# Patient Record
Sex: FEMALE | Race: WHITE | NOT HISPANIC OR LATINO | URBAN - METROPOLITAN AREA
[De-identification: names, ages, dates, MRNs, and addresses within clinical notes are randomized per-mention and may not be internally consistent; named-entity substitution may affect disease eponyms.]

---

## 2017-01-16 ENCOUNTER — OFFICE VISIT (OUTPATIENT)
Dept: OTOLARYNGOLOGY | Facility: CLINIC | Age: 57
End: 2017-01-16

## 2017-01-16 DIAGNOSIS — J38.00 VOCAL CORD PARESIS: Primary | ICD-10-CM

## 2017-01-16 RX ORDER — ESCITALOPRAM OXALATE 10 MG/1
10 TABLET ORAL DAILY
COMMUNITY
End: 2017-10-20

## 2017-01-16 ASSESSMENT — PAIN SCALES - GENERAL: PAINLEVEL: NO PAIN (0)

## 2017-01-16 NOTE — Clinical Note
1/16/2017       RE: Vianey Manning  [address] [facility] [address]     Dear Colleague,    Thank you for referring your patient, Vianey Manning, to the [facility] EAR NOSE AND THROAT at [facility]. Please see a copy of my visit note below.    HISTORY OF PRESENT ILLNESS:  Ms. Manning is back to see us again today.  She feels that her voice is maintaining its strength.  She had a recurrent nerve injury, presumably associated with a thoracic procedure which is felt to likely be temporary.  This happened about a year ago.  We did then do an injection with hydroxyapatite onto the left cord.  She has been doing fairly well since then.      PHYSICAL EXAMINATION:  She is in no distress, alert, interactive, breathing without difficulty or stridor.  Eyes are anicteric.  Skin of the head and neck appears normal.         PROCEDURE:  At that point, the nose is sprayed with lidocaine and Afrin.  A flexible laryngoscopy is performed.  The nasopharynx is normal.  Oropharynx is normal.  Hypopharynx is normal.  The larynx shows left cord still well medialized but not moving.  Right cord moves well, but decent glottic closure on phonation.      ASSESSMENT AND PLAN:  A 56-year-old with persistent left vocal fold paresis.  Implant is still seemingly working well for her.  Follow up in six months or sooner with any problems.      SJ/lz       Again, thank you for allowing me to participate in the care of your patient.      Sincerely,    Mikey Encarnacion MD

## 2017-01-16 NOTE — PROGRESS NOTES
HISTORY OF PRESENT ILLNESS:  Ms. Manning is back to see us again today.  She feels that her voice is maintaining its strength.  She had a recurrent nerve injury, presumably associated with a thoracic procedure which is felt to likely be temporary.  This happened about a year ago.  We did then do an injection with hydroxyapatite onto the left cord.  She has been doing fairly well since then.      PHYSICAL EXAMINATION:  She is in no distress, alert, interactive, breathing without difficulty or stridor.  Eyes are anicteric.  Skin of the head and neck appears normal.         PROCEDURE:  At that point, the nose is sprayed with lidocaine and Afrin.  A flexible laryngoscopy is performed.  The nasopharynx is normal.  Oropharynx is normal.  Hypopharynx is normal.  The larynx shows left cord still well medialized but not moving.  Right cord moves well, but decent glottic closure on phonation.      ASSESSMENT AND PLAN:  A 56-year-old with persistent left vocal fold paresis.  Implant is still seemingly working well for her.  Follow up in six months or sooner with any problems.      SJ/shane

## 2017-01-16 NOTE — PATIENT INSTRUCTIONS
Please follow up in about 6 months to see Dr Encarnacion.  For any questions or concerns in the meantime please call our nurse line at [phone].

## 2017-04-21 ENCOUNTER — OFFICE VISIT (OUTPATIENT)
Dept: SURGERY | Facility: CLINIC | Age: 57
End: 2017-04-21
Attending: CLINICAL NURSE SPECIALIST
Payer: COMMERCIAL

## 2017-04-21 VITALS
WEIGHT: 177.47 LBS | TEMPERATURE: 98.5 F | DIASTOLIC BLOOD PRESSURE: 72 MMHG | HEART RATE: 82 BPM | SYSTOLIC BLOOD PRESSURE: 104 MMHG | BODY MASS INDEX: 30.45 KG/M2 | RESPIRATION RATE: 18 BRPM | OXYGEN SATURATION: 97 %

## 2017-04-21 DIAGNOSIS — Z85.118 H/O: LUNG CANCER: ICD-10-CM

## 2017-04-21 DIAGNOSIS — R91.8 LUNG NODULES: Primary | ICD-10-CM

## 2017-04-21 PROCEDURE — 99212 OFFICE O/P EST SF 10 MIN: CPT

## 2017-04-21 ASSESSMENT — ENCOUNTER SYMPTOMS
NERVOUS/ANXIOUS: 1
HOARSE VOICE: 1

## 2017-04-21 ASSESSMENT — PAIN SCALES - GENERAL: PAINLEVEL: NO PAIN (0)

## 2017-04-21 NOTE — LETTER
4/21/2017       RE: Vianey Manning  [address] [address]     Dear Colleague,    Thank you for referring your patient, Vianey Manning, to the [facility] CANCER [facility]IC. Please see a copy of my visit note below.    REASON FOR VISIT:  6 month surveillance chest CT and follow-up    PRE-OP Diagnosis:   Left lower lobe adenocarcinoma    PROCEDURES PERFORMED:   12/4/15  Left transcervical/thoracoscopic wedge resection  1/25/16  Thoracoscopic LEFT lower lobe completion lobectomy, mediastinal lymphadenectomy    SURGEON:   Dr. Gabriel Herrera    Assessment:   I last saw Ms. Manning in Oct. 2016.   She has been seen x 2 by ENT here for a persistent left vocal cord paresis, had vocal cord injection with Prolaryn Plus for vocal cord mediaslization.  She feels that her voice strength is maintaining at a good level, she continues to teach.   She is anxious today, says she always gets a little nervous when this appointment is due.   She is teaching full-time.    She denies any recent illnesses.    She had a chest CT prior to this appointment, findings include:  1. Stable postoperative changes of left lower lobectomy. No definitive  CT findings within the chest or visualized abdomen to suggest  recurrent or metastatic disease.  2. Indeterminant 3 mm soft tissue pulmonary nodules in the lingula and  right upper lobe, not significantly changed from prior study  10/20/2016. Attention on follow-up recommended    Plan:   Advised to get another chest CT in 6 months.  If the nodules remain unchanged, may then go to annual chest CT scans.       Total time:  30 minutes      Again, thank you for allowing me to participate in the care of your patient.      Sincerely,    SCOT Napier CNS

## 2017-04-21 NOTE — NURSING NOTE
"Vianey Manning is a 57 year old female who presents for:  Chief Complaint   Patient presents with     Oncology Clinic Visit     Return for CT results        Initial Vitals:  /72 (BP Location: Left arm, Patient Position: Chair, Cuff Size: Adult Large)  Pulse 82  Temp 98.5  F (36.9  C) (Oral)  Resp 18  Wt 80.5 kg (177 lb 7.5 oz)  SpO2 97%  BMI 30.45 kg/m2 Estimated body mass index is 30.45 kg/(m^2) as calculated from the following:    Height as of 10/20/16: 1.626 m (5' 4.02\").    Weight as of this encounter: 80.5 kg (177 lb 7.5 oz).. Body surface area is 1.91 meters squared. BP completed using cuff size: large  No Pain (0) No LMP recorded. Patient is postmenopausal. Allergies and medications reviewed.     Medications: Medication refills not needed today.  Pharmacy name entered into [employer]: [facility] [address] [place] [address] [address] AT [facility] [address]    Comments:     6  minutes for nursing intake (face to face time)   Lawanda Castillo MA        "

## 2017-04-21 NOTE — PROGRESS NOTES
REASON FOR VISIT:  6 month surveillance chest CT and follow-up    PRE-OP Diagnosis:   Left lower lobe adenocarcinoma    PROCEDURES PERFORMED:   12/4/15  Left transcervical/thoracoscopic wedge resection  1/25/16  Thoracoscopic LEFT lower lobe completion lobectomy, mediastinal lymphadenectomy    SURGEON:   Dr. Gabriel Herrera    Assessment:   I last saw Ms. Manning in Oct. 2016.   She has been seen x 2 by ENT here for a persistent left vocal cord paresis, had vocal cord injection with Prolaryn Plus for vocal cord mediaslization.  She feels that her voice strength is maintaining at a good level, she continues to teach.   She is anxious today, says she always gets a little nervous when this appointment is due.   She is teaching full-time.    She denies any recent illnesses.    She had a chest CT prior to this appointment, findings include:  1. Stable postoperative changes of left lower lobectomy. No definitive  CT findings within the chest or visualized abdomen to suggest  recurrent or metastatic disease.  2. Indeterminant 3 mm soft tissue pulmonary nodules in the lingula and  right upper lobe, not significantly changed from prior study  10/20/2016. Attention on follow-up recommended    Plan:   Advised to get another chest CT in 6 months.  If the nodules remain unchanged, may then go to annual chest CT scans.      Total time:  30 minutes

## 2017-04-21 NOTE — MR AVS SNAPSHOT
After Visit Summary   4/21/2017    Vianey Manning    MRN: [id number]           Patient Information     Date Of Birth          1960        Visit Information        Provider Department      4/21/2017 2:30 PM Janeth Key APRN CNS [facility]        Today's Diagnoses     Lung nodules    -  1    H/O: lung cancer           Follow-ups after your visit        Follow-up notes from your care team     Return in about 6 months (around 10/21/2017).      Your next 10 appointments already scheduled     Jul 17, 2017  9:00 AM CDT   (Arrive by 8:45 AM)   Return Visit with Mikey Encarnacion MD   [facility] Ear Nose and Throat ([facility] [place])    [address] [facility]  [address] [facility] [address] [phone]              Future tests that were ordered for you today     Open Future Orders        Priority Expected Expires Ordered    CT Chest w/o contrast Routine  4/21/2018 4/21/2017            Who to contact     If you have questions or need follow up information about today's clinic visit or your schedule please contact [facility] CANCER [facility] directly at [phone].  Normal or non-critical lab and imaging results will be communicated to you by MyChart, letter or phone within 4 business days after the clinic has received the results. If you do not hear from us within 7 days, please contact the clinic through [employer] or phone. If you have a critical or abnormal lab result, we will notify you by phone as soon as possible.  Submit refill requests through [employer] or call your pharmacy and they will forward the refill request to us. Please allow 3 business days for your refill to be completed.          Additional Information About Your Visit        MyChart Information     [employer] gives you secure access to your electronic health record. If you see a primary care provider, you can also send messages to your care team and make appointments. If you have  questions, please call your primary care clinic.  If you do not have a primary care provider, please call [phone] and they will assist you.        Care EveryWhere ID     This is your Care EveryWhere ID. This could be used by other organizations to access your [place] medical records  [id number]        Your Vitals Were     Pulse Temperature Respirations Pulse Oximetry BMI (Body Mass Index)       82 98.5  F (36.9  C) (Oral) 18 97% 30.45 kg/m2        Blood Pressure from Last 3 Encounters:   04/21/17 104/72   10/20/16 126/73   05/09/16 120/78    Weight from Last 3 Encounters:   04/21/17 80.5 kg (177 lb 7.5 oz)   10/20/16 77.7 kg (171 lb 6.4 oz)   05/09/16 75.8 kg (167 lb)               Primary Care Provider Office Phone # Fax #    Addison Cobos [phone]       [address] [address]        Thank you!     Thank you for choosing [facility]  for your care. Our goal is always to provide you with excellent care. Hearing back from our patients is one way we can continue to improve our services. Please take a few minutes to complete the written survey that you may receive in the mail after your visit with us. Thank you!             Your Updated Medication List - Protect others around you: Learn how to safely use, store and throw away your medicines at www.disposemymeds.org.          This list is accurate as of: 4/21/17  3:05 PM.  Always use your most recent med list.                   Brand Name Dispense Instructions for use    ASPIRIN PO      Take 81 mg by mouth daily       LEXAPRO 10 MG tablet   Generic drug:  escitalopram      Take 10 mg by mouth daily       SIMVASTATIN PO      Take 10 mg by mouth At Bedtime

## 2017-07-17 ENCOUNTER — OFFICE VISIT (OUTPATIENT)
Dept: OTOLARYNGOLOGY | Facility: CLINIC | Age: 57
End: 2017-07-17

## 2017-07-17 VITALS — BODY MASS INDEX: 31.58 KG/M2 | WEIGHT: 185 LBS | HEIGHT: 64 IN

## 2017-07-17 DIAGNOSIS — J38.00 VOCAL CORD PARALYSIS: Primary | ICD-10-CM

## 2017-07-17 ASSESSMENT — PAIN SCALES - GENERAL: PAINLEVEL: NO PAIN (0)

## 2017-07-17 NOTE — NURSING NOTE
Chief Complaint   Patient presents with     RECHECK     Return - 6 months F/U      Pt states no pain today.    N Tony LAROSE

## 2017-07-17 NOTE — MR AVS SNAPSHOT
After Visit Summary   7/17/2017    Vianey Manning    MRN: [id number]           Patient Information     Date Of Birth          1960        Visit Information        Provider Department      7/17/2017 9:00 AM Mikey Encarnacion MD [facility] Ear Nose and Throat        Care Instructions    1.  You were seen in the ENT Clinic today by Dr. Encarnacion.  If you have any questions or concerns after your appointment, please call [phone].  Press option #1 for scheduling related needs.  Press option #3 for Nurse advice.  2.  Plan is to return to clinic in 6 months for another assessment.                Follow-ups after your visit        Your next 10 appointments already scheduled     Oct 20, 2017  1:40 PM CDT   (Arrive by 1:25 PM)   CT CHEST W/O CONTRAST with UCCT1   [facility] [place] [facility] ([facility])    [address] [address] [phone]           Please bring any scans or X-rays taken at other hospitals, if similar tests were done. Also bring a list of your medicines, including vitamins, minerals and over-the-counter drugs. It is safest to leave personal items at home.  Be sure to tell your doctor:   If you have any allergies.   If there s any chance you are pregnant.   If you are breastfeeding.   If you have any special needs.  You do not need to do anything special to prepare.  Please wear loose clothing, such as a sweat suit or jogging clothes. Avoid snaps, zippers and other metal. We may ask you to undress and put on a hospital gown.            Oct 20, 2017  2:30 PM CDT   (Arrive by 2:15 PM)   Return Visit with SCOT Napier [facility] ([facility])    [address] [facility] [address] [facility]  [address] [facility] [address] [phone]            Nov 20, 2017  9:00 AM CST   (Arrive by 8:45 AM)   Return Visit with MD TOMA Parsons [facility] Ear Nose [facility] Th[facility]at ([facility]    [address] [facility] [address] [facility]  [address] [facility] [address] [phone]              Who to contact     Please call your clinic at [phone] to:    Ask questions about your health    Make or cancel appointments    Discuss your medicines    Learn about your test results    Speak to your doctor   If you have compliments or concerns about an experience at your clinic, or if you wish to file a complaint, please contact [facility] Patient Relations at [phone] or email us at [email or web address]         Additional Information About Your Visit        [employer] Information     [employer] gives you secure access to your electronic health record. If you see a primary care provider, you can also send messages to your care team and make appointments. If you have questions, please call your primary care clinic.  If you do not have a primary care provider, please call [phone] and they will assist you.      SMATOOS is an electronic gateway that provides easy, online access to your medical records. With SMATOOS, you can request a clinic appointment, read your test results, renew a prescription or communicate with your care team.     To access your existing account, please contact your [facility] Clinic or call [phone] for assistance.        Care EveryWhere ID     This is your Care EveryWhere ID. This could be used by other organizations to access your [place] medical records  [id number]        Your Vitals Were     Height BMI (Body Mass Index)                1.626 m (5' 4\") 31.76 kg/m2           Blood Pressure from Last 3 Encounters:   04/21/17 104/72   10/20/16 126/73   05/09/16 120/78    Weight from Last 3 Encounters:   07/17/17 83.9 kg (185 lb)   04/21/17 80.5 kg (177 lb 7.5 oz)   10/20/16 77.7 kg (171 lb 6.4 oz)              Today, you had the following     No orders found for display       Primary Care Provider Office Phone # Fax " #    Addison Cobos [phone]       [facility] [address] [facility] [address]        Equal Access to Services     JESSICA FINNEGAN : Hadii aad ku hadmahadamada Sojaxson, wadenysda luqadaha, qaybta kaalmada rena, macho clintin hayaavinicio linmady garcia darrius rosenbaum. So [facility] [phone].    ATENCIÓN: Si habla español, tiene a mir disposición servicios gratuitos de asistencia lingüística. Llame al [phone].    We comply with applicable federal civil rights laws and [place] laws. We do not discriminate on the basis of race, color, national origin, age, disability sex, sexual orientation or gender identity.            Thank you!     Thank you for choosing [facility] EAR NOSE AND THROAT  for your care. Our goal is always to provide you with excellent care. Hearing back from our patients is one way we can continue to improve our services. Please take a few minutes to complete the written survey that you may receive in the mail after your visit with us. Thank you!             Your Updated Medication List - Protect others around you: Learn how to safely use, store and throw away your medicines at www.disposemymeds.org.          This list is accurate as of: 7/17/17  9:24 AM.  Always use your most recent med list.                   Brand Name Dispense Instructions for use Diagnosis    ASPIRIN PO      Take 81 mg by mouth daily        LEXAPRO 10 MG tablet   Generic drug:  escitalopram      Take 10 mg by mouth daily        SIMVASTATIN PO      Take 10 mg by mouth At Bedtime

## 2017-07-17 NOTE — PROGRESS NOTES
Ms. Manning is back to see us in today. Her voice has been quite stable. It is been a year and a half since her thoracic procedure which seems to have initiated her left vocal cord paresis.    PHYSICAL EXAMINATION:  This is a 57 year old  year old female   in no acute distress.  Normal mood, normal affect, speech is strong.  Alert and appropriate.  Head is normocephalic.  Cranial nerve VII is House-Brackmann I out of VI bilaterally.  Breathing without difficulty or stridor.  Eyes are anicteric.       At that point verbal consent is obtained, the nose is sprayed with lidocaine and Afrin and flexible endoscopy is performed into the nose.  the nasopharynx, the oropharynx, the larynx shows left focal cord paralysis, good closure and phonation with good movement of the right cord.    57-year-old with persistent left vocal cord paresis. At this point plan on continued monitoring as she is getting good use of her injection implant. Follow-up in 5 months.    Mikey Encarnacion

## 2017-07-17 NOTE — PATIENT INSTRUCTIONS
1.  You were seen in the ENT Clinic today by Dr. Encarnacion.  If you have any questions or concerns after your appointment, please call [phone].  Press option #1 for scheduling related needs.  Press option #3 for Nurse advice.  2.  Plan is to return to clinic in 6 months for another assessment.

## 2017-07-17 NOTE — LETTER
7/17/2017       RE: Vianey Manning  [address] [address]     Dear Colleague,    Thank you for referring your patient, Vianey Manning, to the [facility] EAR NOSE AND THROAT at [facility]. Please see a copy of my visit note below.    Ms. Manning is back to see us in today. Her voice has been quite stable. It is been a year and a half since her thoracic procedure which seems to have initiated her left vocal cord paresis.    PHYSICAL EXAMINATION:  This is a 57 year old  year old female   in no acute distress.  Normal mood, normal affect, speech is strong.  Alert and appropriate.  Head is normocephalic.  Cranial nerve VII is House-Brackmann I out of VI bilaterally.  Breathing without difficulty or stridor.  Eyes are anicteric.       At that point verbal consent is obtained, the nose is sprayed with lidocaine and Afrin and flexible endoscopy is performed into the nose.  the nasopharynx, the oropharynx, the larynx shows left focal cord paralysis, good closure and phonation with good movement of the right cord.    57-year-old with persistent left vocal cord paresis. At this point plan on continued monitoring as she is getting good use of her injection implant. Follow-up in 5 months.        Again, thank you for allowing me to participate in the care of your patient.      Sincerely,    Mikey Encarnacion MD

## 2017-10-20 ENCOUNTER — OFFICE VISIT (OUTPATIENT)
Dept: SURGERY | Facility: CLINIC | Age: 57
End: 2017-10-20
Attending: CLINICAL NURSE SPECIALIST
Payer: COMMERCIAL

## 2017-10-20 DIAGNOSIS — Z85.118 H/O: LUNG CANCER: Primary | ICD-10-CM

## 2017-10-20 PROCEDURE — 99212 OFFICE O/P EST SF 10 MIN: CPT | Mod: ZF

## 2017-10-20 NOTE — MR AVS SNAPSHOT
After Visit Summary   10/20/2017    Vianey Manning    MRN: [id number]           Patient Information     Date Of Birth          1960        Visit Information        Provider Department      10/20/2017 2:30 PM Janeth Key APRN CNS [facility]        Today's Diagnoses     H/O: lung cancer    -  1       Follow-ups after your visit        Follow-up notes from your care team     Return in about 6 months (around 4/20/2018).      Your next 10 appointments already scheduled     Nov 20, 2017  9:00 AM CST   (Arrive by 8:45 AM)   Return Visit with Mikey Encarnacion MD   [facility] ([facility])    [address] [place] [address]  4th Floor  [facility] [address] [phone]            Apr 20, 2018  1:40 PM CDT   (Arrive by 1:25 PM)   CT CHEST W/O CONTRAST with UCCT2   [facility] [place] [facility] ([facility])    [address] [place] [address]  1st Floor  [facility] [address] [phone]           Please bring any scans or X-rays taken at other hospitals, if similar tests were done. Also bring a list of your medicines, including vitamins, minerals and over-the-counter drugs. It is safest to leave personal items at home.  Be sure to tell your doctor:   If you have any allergies.   If there s any chance you are pregnant.   If you are breastfeeding.   If you have any special needs.  You do not need to do anything special to prepare.  Please wear loose clothing, such as a sweat suit or jogging clothes. Avoid snaps, zippers and other metal. We may ask you to undress and put on a hospital gown.            Apr 20, 2018  2:30 PM CDT   (Arrive by 2:15 PM)   Return Visit with SCOT Napier   [facility] ([facility])    [address] [place] [address]  2nd [facility] [address] [phone]              Who to contact     If you have questions or need follow up  information about today's clinic visit or your schedule please contact [facility] directly at [phone].  Normal or non-critical lab and imaging results will be communicated to you by [employer], letter or phone within 4 business days after the clinic has received the results. If you do not hear from us within 7 days, please contact the clinic through [employer] or phone. If you have a critical or abnormal lab result, we will notify you by phone as soon as possible.  Submit refill requests through [employer] or call your pharmacy and they will forward the refill request to us. Please allow 3 business days for your refill to be completed.          Additional Information About Your Visit        Gift[employer] Information     [employer] gives you secure access to your electronic health record. If you see a primary care provider, you can also send messages to your care team and make appointments. If you have questions, please call your primary care clinic.  If you do not have a primary care provider, please call [phone] and they will assist you.        Care EveryWhere ID     This is your Care EveryWhere ID. This could be used by other organizations to access your [place] medical records  [id number]         Blood Pressure from Last 3 Encounters:   04/21/17 104/72   10/20/16 126/73   05/09/16 120/78    Weight from Last 3 Encounters:   07/17/17 83.9 kg (185 lb)   04/21/17 80.5 kg (177 lb 7.5 oz)   10/20/16 77.7 kg (171 lb 6.4 oz)                 Today's Medication Changes          These changes are accurate as of: 10/20/17 11:59 PM.  If you have any questions, ask your nurse or doctor.               Stop taking these medicines if you haven't already. Please contact your care team if you have questions.     LEXAPRO 10 MG tablet   Generic drug:  escitalopram   Stopped by:  Janeth Key APRN CNS                    Primary Care Provider Office Phone # Fax #    Addison Maricruzjasen [phone]        [facility] [address] [facility] [address]        Equal Access to Services     JESSICA FINNEGAN : Hadii sury [place], wadenysda moira, qatimbota luis armandomabirgit chase, macho rosenbaum. So [facility] [phone].    ATENCIÓN: Si habla español, tiene a mir disposición servicios gratuitos de asistencia lingüística. Llame al [phone].    We comply with applicable federal civil rights laws and [place] laws. We do not discriminate on the basis of race, color, national origin, age, disability, sex, sexual orientation, or gender identity.            Thank you!     Thank you for choosing [facility]  for your care. Our goal is always to provide you with excellent care. Hearing back from our patients is one way we can continue to improve our services. Please take a few minutes to complete the written survey that you may receive in the mail after your visit with us. Thank you!             Your Updated Medication List - Protect others around you: Learn how to safely use, store and throw away your medicines at www.disposemymeds.org.          This list is accurate as of: 10/20/17 11:59 PM.  Always use your most recent med list.                   Brand Name Dispense Instructions for use Diagnosis    ASPIRIN PO      Take 81 mg by mouth daily        SIMVASTATIN PO      Take 10 mg by mouth At Bedtime

## 2017-10-20 NOTE — PROGRESS NOTES
REASON FOR VISIT:  6 month f/u appointment s/p lobectomy for LLL adenocarcinoma    PROCEDURES PERFORMED:  Thoracoscopic LEFT lower lobe completion lobectomy, mediastinal lymphadenectomy    DATE ABOVE PROCEDURES PERFORMED:  12/4/15    SURGEON:  Dr. Gabriel Herrera    History of Present Illness:         Patient is a pleasant 57 year old female who is here today with her .  She was treated for a left lower lobe well-differentiated adenocarcinoma.  She is currently having follow-up appointments and scans every 6 months.    Histopathology:    FINAL DIAGNOSIS:   Lung, left, lower lobe, wedge resection:   - Invasive adenocarcinoma, well differentiated        - Specimen integrity: Intact        - Histologic type: Adenocarcinoma with mixed acinar (60%),   papillary (25%), and lipidic (15%) patterns of growth        - Histologic grade: Well differentiated        - Tumor site: Left lower lobe        - Tumor size: 1.4 cm in greatest dimension             - invasive focus: More than 0.5 cm        - Tumor focality: Unifocal        - Visceral pleural invasion: No invasion identified (PL0)        - Extra-pulmonary tumor extension: Not Applicable        - Margins: Negative             - Bronchial margin: Not applicable.             - Vascular margin: Not applicable.             - Parenchymal margin: Negative                  - 1.5 cm from the parenchymal resection margin        - Treatment effect: Not applicable        - Lymphovascular invasion: Not identified        - Large vein and artery involvement: Not identified        - Lymph nodes: Not submitted        - Pathologic staging:  pT1aNx   - Non-neoplastic lung with respiratory bronchiolit       Assessment:       Patient is doing well and denies any recent illnesses or concerns.   She has been feeling more tired this week but relates that to the seasonal changes.   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   Her voice seems  strong and she will f/u again in a few months with ENT, has had one vocal cord injection with good results.       Chest CT done today was reviewed, with the following findings:  IMPRESSION:   1. Stable postoperative changes of left lower lobectomy without  evidence of recurrent or metastatic disease.  2. 4 mm groundglass nodule in the posterior right apex is new from  prior, likely inflammatory. Recommend attention on follow-up exams.      Plan:    Will plan another 6 months f/u scan and appointment.   She and her  are in agreement with this.   All questions answered.        Total time:  30 minutes    Answers for HPI/ROS submitted by the patient on 10/19/2017   General Symptoms: No  Skin Symptoms: No  HENT Symptoms: No  EYE SYMPTOMS: No  HEART SYMPTOMS: No  LUNG SYMPTOMS: No  INTESTINAL SYMPTOMS: No  URINARY SYMPTOMS: No  GYNECOLOGIC SYMPTOMS: No  BREAST SYMPTOMS: No  SKELETAL SYMPTOMS: No  BLOOD SYMPTOMS: No  NERVOUS SYSTEM SYMPTOMS: No  MENTAL HEALTH SYMPTOMS: No

## 2017-10-20 NOTE — LETTER
10/20/2017        RE: Vianey Manning  [address] [facility] [facility] [address]     Dear Colleague,    Thank you for referring your patient, Vianey Manning, to the [facility]. Please see a copy of my visit note below.    REASON FOR VISIT:  6 month f/u appointment s/p lobectomy for LLL adenocarcinoma    PROCEDURES PERFORMED:  Thoracoscopic LEFT lower lobe completion lobectomy, mediastinal lymphadenectomy    DATE ABOVE PROCEDURES PERFORMED:  12/4/15    SURGEON:  Dr. Gabriel Herrera    History of Present Illness:         Patient is a pleasant 57 year old female who is here today with her .  She was treated for a left lower lobe well-differentiated adenocarcinoma.  She is currently having follow-up appointments and scans every 6 months.    Histopathology:    FINAL DIAGNOSIS:   Lung, left, lower lobe, wedge resection:   - Invasive adenocarcinoma, well differentiated        - Specimen integrity: Intact        - Histologic type: Adenocarcinoma with mixed acinar (60%),   papillary (25%), and lipidic (15%) patterns of growth        - Histologic grade: Well differentiated        - Tumor site: Left lower lobe        - Tumor size: 1.4 cm in greatest dimension             - invasive focus: More than 0.5 cm        - Tumor focality: Unifocal        - Visceral pleural invasion: No invasion identified (PL0)        - Extra-pulmonary tumor extension: Not Applicable        - Margins: Negative             - Bronchial margin: Not applicable.             - Vascular margin: Not applicable.             - Parenchymal margin: Negative                  - 1.5 cm from the parenchymal resection margin        - Treatment effect: Not applicable        - Lymphovascular invasion: Not identified        - Large vein and artery involvement: Not identified        - Lymph nodes: Not submitted        - Pathologic staging:  pT1aNx   - Non-neoplastic lung with respiratory bronchiolit       Assessment:       Patient is  doing well and denies any recent illnesses or concerns.   She has been feeling more tired this week but relates that to the seasonal changes.   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   Her voice seems strong and she will f/u again in a few months with ENT, has had one vocal cord injection with good results.       Chest CT done today was reviewed, with the following findings:  IMPRESSION:   1. Stable postoperative changes of left lower lobectomy without  evidence of recurrent or metastatic disease.  2. 4 mm groundglass nodule in the posterior right apex is new from  prior, likely inflammatory. Recommend attention on follow-up exams.      Plan:    Will plan another 6 months f/u scan and appointment.   She and her  are in agreement with this.   All questions answered.        Total time:  30 minutes    Answers for HPI/ROS submitted by the patient on 10/19/2017   General Symptoms: No  Skin Symptoms: No  HENT Symptoms: No  EYE SYMPTOMS: No  HEART SYMPTOMS: No  LUNG SYMPTOMS: No  INTESTINAL SYMPTOMS: No  URINARY SYMPTOMS: No  GYNECOLOGIC SYMPTOMS: No  BREAST SYMPTOMS: No  SKELETAL SYMPTOMS: No  BLOOD SYMPTOMS: No  NERVOUS SYSTEM SYMPTOMS: No  MENTAL HEALTH SYMPTOMS: No      Again, thank you for allowing me to participate in the care of your patient.      Sincerely,    SCOT Napier CNS

## 2017-10-20 NOTE — Clinical Note
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

## 2017-10-20 NOTE — NURSING NOTE
"Oncology Rooming Note    October 20, 2017 2:36 PM   Vianey Manning is a 57 year old female who presents for:    Chief Complaint   Patient presents with     Oncology Clinic Visit     Return for Lung Ca , CT results      Initial Vitals: There were no vitals taken for this visit. Estimated body mass index is 31.76 kg/(m^2) as calculated from the following:    Height as of 7/17/17: 1.626 m (5' 4\").    Weight as of 7/17/17: 83.9 kg (185 lb). There is no height or weight on file to calculate BSA.  Data Unavailable Comment: Data Unavailable   No LMP recorded. Patient is postmenopausal.  Allergies reviewed: Yes  Medications reviewed: Yes    Medications: Medication refills not needed today.  Pharmacy name entered into IWT: [facility] [address] [place] [address] [address]  AT [facility] [address]    Clinical concerns: CT results  Shahid was notified.     6 minutes for nursing intake (face to face time)     Lawanda Castillo MA              "

## 2017-11-20 ENCOUNTER — OFFICE VISIT (OUTPATIENT)
Dept: OTOLARYNGOLOGY | Facility: CLINIC | Age: 57
End: 2017-11-20
Payer: COMMERCIAL

## 2017-11-20 DIAGNOSIS — J38.00 VOCAL CORD PARALYSIS: Primary | ICD-10-CM

## 2017-11-20 ASSESSMENT — PAIN SCALES - GENERAL: PAINLEVEL: NO PAIN (0)

## 2017-11-20 NOTE — LETTER
11/20/2017       RE: Vianey Manning  [address] [address] [facility] [address]     Dear Colleague,    Thank you for referring your patient, Vianey Manning, to the [facility] E[facility]SE [facility]AT at [facility]. Please see a copy of my visit note below.    HISTORY OF PRESENT ILLNESS:  Ms. Manning is back to see us again today.  She is doing well with regards to her voice.  She has been also doing well with regards to her cancer with no evidence of any recurrence at this time.  She is about two years out since her surgery on her chest for lung cancer and her subsequent left vocal fold paralysis.        PHYSICAL EXAMINATION:  Her voice is strong.  She has no substantial dysphonia.  She does otherwise have no acute complaints.  Head is normocephalic.  Cranial nerve VII is House-Brackmann I out of VI bilaterally.  Breathing without difficulty or stridor.  Eyes are anicteric.  Skin of the head and neck appears normal.      PROCEDURE:  Verbal consent was obtained.  The nose is sprayed with lidocaine and Afrin and flexible laryngoscopy is performed.  Nasopharynx is normal.  Oropharynx is normal.  Hypopharynx is normal.  Larynx shows hooding on the left arytenoid with left vocal cord fixed.  There is still good contact on phonation.      ASSESSMENT AND PLAN:  A 57-year-old with persistent left vocal fold paralysis.  At this point, recovery of function is unlikely, but she is getting adequate vocal strength after her injection a year and a half ago.  She will come back and see us should her voice start to become worse in the future.  We would be happy to intervene at that time.       Sincerely,    Mikey Encarnacion MD

## 2017-11-20 NOTE — MR AVS SNAPSHOT
After Visit Summary   11/20/2017    Vianey Manning    MRN: [id number]           Patient Information     Date Of Birth          1960        Visit Information        Provider Department      11/20/2017 9:00 AM Mikey Encarnacion MD [facility]        Today's Diagnoses     Vocal cord paralysis    -  1      Care Instructions    Please follow up to see Dr Encarnacion as needed. For questions or concerns please call the RN care coordinator.   Gerry Pelayo RN  [phone]              Follow-ups after your visit        Your next 10 appointments already scheduled     Apr 20, 2018  1:40 PM CDT   (Arrive by 1:25 PM)   CT CHEST W/O CONTRAST with UCCT2   [facility] [place] [facility] ([facility] [place])    [address] [place] [address] [phone]           Please bring any scans or X-rays taken at other hospitals, if similar tests were done. Also bring a list of your medicines, including vitamins, minerals and over-the-counter drugs. It is safest to leave personal items at home.  Be sure to tell your doctor:   If you have any allergies.   If there s any chance you are pregnant.   If you are breastfeeding.   If you have any special needs.  You do not need to do anything special to prepare.  Please wear loose clothing, such as a sweat suit or jogging clothes. Avoid snaps, zippers and other metal. We may ask you to undress and put on a hospital gown.            Apr 20, 2018  2:30 PM CDT   (Arrive by 2:15 PM)   Return Visit with SCOT Napier [facility] ([facility])    [address] [place] [address] [phone]              Who to contact     Please call your clinic at [phone] to:    Ask questions about your health    Make or cancel appointments    Discuss your medicines    Learn about your test results    Speak to your doctor   If you have  compliments or concerns about an experience at your clinic, or if you wish to file a complaint, please contact [facility] Patient Relations at [phone] or email us at [email or web address]         Additional Information About Your Visit        MyChart Information     [employer] gives you secure access to your electronic health record. If you see a primary care provider, you can also send messages to your care team and make appointments. If you have questions, please call your primary care clinic.  If you do not have a primary care provider, please call [phone] and they will assist you.      "[employer]" is an electronic gateway that provides easy, online access to your medical records. With "[employer]", you can request a clinic appointment, read your test results, renew a prescription or communicate with your care team.     To access your existing account, please contact your [facility] Clinic or call [phone] for assistance.        Care EveryWhere ID     This is your Care EveryWhere ID. This could be used by other organizations to access your [place] medical records  [id number]         Blood Pressure from Last 3 Encounters:   04/21/17 104/72   10/20/16 126/73   05/09/16 120/78    Weight from Last 3 Encounters:   07/17/17 83.9 kg (185 lb)   04/21/17 80.5 kg (177 lb 7.5 oz)   10/20/16 77.7 kg (171 lb 6.4 oz)              We Performed the Following     LARYNGOSCOPY FLEX FIBEROPTIC, DIAGNOSTIC        Primary Care Provider Office Phone # Fax #    Addison Cobos [phone]       [address] [address]        Equal Access to Services     JESSICA FINNEGAN AH: Hadii sury [place], waaxda luqadaha, qaybta kaalmada macho chase. So [facility] [phone].    ATENCIÓN: Si habla español, tiene a mir disposición servicios gratuitos de asistencia lingüística. Llame al  [phone].    We comply with applicable federal civil rights laws and [place] laws. We do not discriminate on the basis of race, color, national origin, age, disability, sex, sexual orientation, or gender identity.            Thank you!     Thank you for choosing [facility] EAR NOSE AND THROAT  for your care. Our goal is always to provide you with excellent care. Hearing back from our patients is one way we can continue to improve our services. Please take a few minutes to complete the written survey that you may receive in the mail after your visit with us. Thank you!             Your Updated Medication List - Protect others around you: Learn how to safely use, store and throw away your medicines at www.disposemymeds.org.          This list is accurate as of: 11/20/17 11:59 PM.  Always use your most recent med list.                   Brand Name Dispense Instructions for use Diagnosis    ASPIRIN PO      Take 81 mg by mouth daily        SIMVASTATIN PO      Take 10 mg by mouth At Bedtime

## 2017-11-20 NOTE — PROGRESS NOTES
HISTORY OF PRESENT ILLNESS:  Ms. Manning is back to see us again today.  She is doing well with regards to her voice.  She has been also doing well with regards to her cancer with no evidence of any recurrence at this time.  She is about two years out since her surgery on her chest for lung cancer and her subsequent left vocal fold paralysis.        PHYSICAL EXAMINATION:  Her voice is strong.  She has no substantial dysphonia.  She does otherwise have no acute complaints.  Head is normocephalic.  Cranial nerve VII is House-Brackmann I out of VI bilaterally.  Breathing without difficulty or stridor.  Eyes are anicteric.  Skin of the head and neck appears normal.      PROCEDURE:  Verbal consent was obtained.  The nose is sprayed with lidocaine and Afrin and flexible laryngoscopy is performed.  Nasopharynx is normal.  Oropharynx is normal.  Hypopharynx is normal.  Larynx shows hooding on the left arytenoid with left vocal cord fixed.  There is still good contact on phonation.      ASSESSMENT AND PLAN:  A 57-year-old with persistent left vocal fold paralysis.  At this point, recovery of function is unlikely, but she is getting adequate vocal strength after her injection a year and a half ago.  She will come back and see us should her voice start to become worse in the future.  We would be happy to intervene at that time.

## 2017-11-20 NOTE — PATIENT INSTRUCTIONS
Please follow up to see Dr Encarnacion as needed. For questions or concerns please call the RN care coordinator.   Gerry Pelayo RN  [phone]

## 2017-11-26 ENCOUNTER — HEALTH MAINTENANCE LETTER (OUTPATIENT)
Age: 57
End: 2017-11-26

## 2018-04-17 ASSESSMENT — ENCOUNTER SYMPTOMS
HYPOTENSION: 0
EXERCISE INTOLERANCE: 0
PALPITATIONS: 0
LIGHT-HEADEDNESS: 0
ORTHOPNEA: 0
HYPERTENSION: 0
SYNCOPE: 0
SLEEP DISTURBANCES DUE TO BREATHING: 0
LEG PAIN: 0

## 2018-04-20 ENCOUNTER — RADIANT APPOINTMENT (OUTPATIENT)
Dept: CT IMAGING | Facility: CLINIC | Age: 58
End: 2018-04-20
Attending: CLINICAL NURSE SPECIALIST
Payer: COMMERCIAL

## 2018-04-20 ENCOUNTER — OFFICE VISIT (OUTPATIENT)
Dept: SURGERY | Facility: CLINIC | Age: 58
End: 2018-04-20
Attending: CLINICAL NURSE SPECIALIST
Payer: COMMERCIAL

## 2018-04-20 VITALS
TEMPERATURE: 98.8 F | BODY MASS INDEX: 30.38 KG/M2 | WEIGHT: 177 LBS | SYSTOLIC BLOOD PRESSURE: 113 MMHG | HEART RATE: 92 BPM | DIASTOLIC BLOOD PRESSURE: 67 MMHG | RESPIRATION RATE: 16 BRPM | OXYGEN SATURATION: 94 %

## 2018-04-20 DIAGNOSIS — Z85.118 H/O: LUNG CANCER: ICD-10-CM

## 2018-04-20 DIAGNOSIS — C34.90 MALIGNANT NEOPLASM OF LUNG, UNSPECIFIED LATERALITY, UNSPECIFIED PART OF LUNG (H): Primary | ICD-10-CM

## 2018-04-20 PROCEDURE — G0463 HOSPITAL OUTPT CLINIC VISIT: HCPCS | Mod: ZF

## 2018-04-20 ASSESSMENT — PAIN SCALES - GENERAL: PAINLEVEL: NO PAIN (0)

## 2018-04-20 NOTE — MR AVS SNAPSHOT
After Visit Summary   4/20/2018    Vianey Manning    MRN: [id number]           Patient Information     Date Of Birth          1960        Visit Information        Provider Department      4/20/2018 2:30 PM Janeth Key APRN CNS [facility]        Today's Diagnoses     Malignant neoplasm of lung, unspecified laterality, unspecified part of lung (H)    -  1       Follow-ups after your visit        Follow-up notes from your care team     Return in about 1 year (around 4/20/2019).      Your next 10 appointments already scheduled     Apr 19, 2019  2:45 PM CDT   (Arrive by 2:30 PM)   Return Visit with SCOT Landers CNP   [facility] [place])    [address] [place] [address]  [address] [place] [id number] [phone]              Who to contact     If you have questions or need follow up information about today's clinic visit or your schedule please contact [facility] directly at [phone].  Normal or non-critical lab and imaging results will be communicated to you by LeKioskhart, letter or phone within 4 business days after the clinic has received the results. If you do not hear from us within 7 days, please contact the clinic through LeKioskhart or phone. If you have a critical or abnormal lab result, we will notify you by phone as soon as possible.  Submit refill requests through 25eight or call your pharmacy and they will forward the refill request to us. Please allow 3 business days for your refill to be completed.          Additional Information About Your Visit        MyChart Information     25eight gives you secure access to your electronic health record. If you see a primary care provider, you can also send messages to your care team and make appointments. If you have questions, please call your primary care clinic.  If you do not have a primary care provider, please call [phone]  and they will assist you.        Care EveryWhere ID     This is your Care EveryWhere ID. This could be used by other organizations to access your [facility] medical records  [id number]        Your Vitals Were     Pulse Temperature Respirations Pulse Oximetry BMI (Body Mass Index)       92 98.8  F (37.1  C) (Oral) 16 94% 30.38 kg/m2        Blood Pressure from Last 3 Encounters:   04/20/18 113/67   04/21/17 104/72   10/20/16 126/73    Weight from Last 3 Encounters:   04/20/18 80.3 kg (177 lb)   07/17/17 83.9 kg (185 lb)   04/21/17 80.5 kg (177 lb 7.5 oz)               Primary Care Provider Office Phone # Fax #    Addison Cobos [phone]       [facility] [address] [facility] [address]        Equal Access to Services     JESSICA FINNEGAN : Hadii aad ku hadasho Soomaali, waaxda luqadaha, qaybta kaalmada adeegyada, macho antonio haykorinan bc rizo . So [facility] [phone].    ATENCIÓN: Si habla español, tiene a mir disposición servicios gratuitos de asistencia lingüística. Brian al [phone].    We comply with applicable federal civil rights laws and [place] laws. We do not discriminate on the basis of race, color, national origin, age, disability, sex, sexual orientation, or gender identity.            Thank you!     Thank you for choosing [facility]  for your care. Our goal is always to provide you with excellent care. Hearing back from our patients is one way we can continue to improve our services. Please take a few minutes to complete the written survey that you may receive in the mail after your visit with us. Thank you!             Your Updated Medication List - Protect others around you: Learn how to safely use, store and throw away your medicines at www.disposemymeds.org.          This list is accurate as of 4/20/18 11:59 PM.  Always use your most recent med list.                   Brand Name Dispense Instructions for use Diagnosis    ASPIRIN PO      Take 81  mg by mouth daily        SIMVASTATIN PO      Take 10 mg by mouth At Bedtime

## 2018-04-20 NOTE — NURSING NOTE
"Oncology Rooming Note    April 20, 2018 2:32 PM   Vianey Manning is a 58 year old female who presents for:    Chief Complaint   Patient presents with     Oncology Clinic Visit     6 month f/u Lung nodule     Initial Vitals: /67  Pulse 92  Temp 98.8  F (37.1  C) (Oral)  Resp 16  Wt 80.3 kg (177 lb)  SpO2 94%  BMI 30.38 kg/m2 Estimated body mass index is 30.38 kg/(m^2) as calculated from the following:    Height as of 7/17/17: 1.626 m (5' 4\").    Weight as of this encounter: 80.3 kg (177 lb). Body surface area is 1.9 meters squared.  No Pain (0) Comment: Data Unavailable   No LMP recorded. Patient is postmenopausal.  Allergies reviewed: Yes  Medications reviewed: Yes    Medications: Medication refills not needed today.  Pharmacy name entered into BayRu: [facility] [address] [place] [address] [address]  AT [facility] [address]    Clinical concerns: Patient states there are no new concerns to discuss with provider.  Janeth Key was not notified.       8 minutes for nursing intake (face to face time)     Harika Adler CMA              "

## 2018-04-24 NOTE — PROGRESS NOTES
REASON FOR VISIT:   6 mo surveillance CT s/p lobectomy for LLL adenocarcinoma    PROCEDURES PERFORMED:  LVATS left lower lobe completion lobectomy, mediastinal lymphadenoctomy    DATE ABOVE PROCEDURES PERFORMED:  12/4/15    SURGEON:  Dr. Gabriel Herrera    HISTOPATHOLOGY:  FINAL DIAGNOSIS:   Lung, left, lower lobe, wedge resection:   - Invasive adenocarcinoma, well differentiated        - Specimen integrity: Intact        - Histologic type: Adenocarcinoma with mixed acinar (60%),   papillary (25%), and lipidic (15%) patterns of growth        - Histologic grade: Well differentiated        - Tumor site: Left lower lobe        - Tumor size: 1.4 cm in greatest dimension             - invasive focus: More than 0.5 cm        - Tumor focality: Unifocal        - Visceral pleural invasion: No invasion identified (PL0)        - Extra-pulmonary tumor extension: Not Applicable        - Margins: Negative             - Bronchial margin: Not applicable.             - Vascular margin: Not applicable.             - Parenchymal margin: Negative                  - 1.5 cm from the parenchymal resection margin        - Treatment effect: Not applicable        - Lymphovascular invasion: Not identified        - Large vein and artery involvement: Not identified        - Lymph nodes: Not submitted        - Pathologic staging:  pT1aNx   - Non-neoplastic lung with respiratory bronchiolit        Assessment:  Vianey is here today with her .  She reports doing well with no recent illnesses.  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   She states her voice is strong and she sees ENT on a PRN basis for treatment of a paralyzed vocal cord.         Chest CT done today reviewed, with the following results:  Impression:  1. Stable postoperative changes of left lower lobectomy without  evidence for recurrence or metastatic disease.  2. Previously seen right upper lobe groundglass opacity has  resolved.         We can now proceed to annual surveillance CT scans.   Questions were answered and Vianey agrees with this plan.    Plan:   Surveillance chest CT scan in 1 year.    Total time:  30 minutes  Answers for HPI/ROS submitted by the patient on 4/17/2018   General Symptoms: No  Skin Symptoms: No  HENT Symptoms: No  EYE SYMPTOMS: No  HEART SYMPTOMS: Yes  LUNG SYMPTOMS: No  INTESTINAL SYMPTOMS: No  URINARY SYMPTOMS: No  GYNECOLOGIC SYMPTOMS: No  BREAST SYMPTOMS: No  SKELETAL SYMPTOMS: No  BLOOD SYMPTOMS: No  NERVOUS SYSTEM SYMPTOMS: No  MENTAL HEALTH SYMPTOMS: No  Chest pain or pressure: Yes  Fast or irregular heartbeat: No  Pain in legs with walking: No  Trouble breathing while lying down: No  Fingers or toes appear blue: No  High blood pressure: No  Low blood pressure: No  Fainting: No  Murmurs: No  Pacemaker: No  Varicose veins: No  Edema or swelling: No  Wake up at night with shortness of breath: No  Light-headedness: No  Exercise intolerance: No

## 2019-03-05 ENCOUNTER — DOCUMENTATION ONLY (OUTPATIENT)
Dept: CARE COORDINATION | Facility: CLINIC | Age: 59
End: 2019-03-05

## 2019-04-17 ENCOUNTER — ANCILLARY PROCEDURE (OUTPATIENT)
Dept: CT IMAGING | Facility: CLINIC | Age: 59
End: 2019-04-17
Attending: CLINICAL NURSE SPECIALIST
Payer: COMMERCIAL

## 2019-04-17 ENCOUNTER — OFFICE VISIT (OUTPATIENT)
Dept: SURGERY | Facility: CLINIC | Age: 59
End: 2019-04-17
Attending: CLINICAL NURSE SPECIALIST
Payer: COMMERCIAL

## 2019-04-17 VITALS
SYSTOLIC BLOOD PRESSURE: 114 MMHG | TEMPERATURE: 97.5 F | HEIGHT: 64 IN | OXYGEN SATURATION: 96 % | DIASTOLIC BLOOD PRESSURE: 70 MMHG | BODY MASS INDEX: 29.19 KG/M2 | HEART RATE: 82 BPM | RESPIRATION RATE: 16 BRPM | WEIGHT: 171 LBS

## 2019-04-17 DIAGNOSIS — C34.90 MALIGNANT NEOPLASM OF LUNG, UNSPECIFIED LATERALITY, UNSPECIFIED PART OF LUNG (H): Primary | ICD-10-CM

## 2019-04-17 DIAGNOSIS — C34.90 MALIGNANT NEOPLASM OF LUNG, UNSPECIFIED LATERALITY, UNSPECIFIED PART OF LUNG (H): ICD-10-CM

## 2019-04-17 PROCEDURE — G0463 HOSPITAL OUTPT CLINIC VISIT: HCPCS | Mod: ZF

## 2019-04-17 RX ORDER — ESCITALOPRAM OXALATE 10 MG/1
TABLET ORAL
COMMUNITY
Start: 2019-04-16 | End: 2024-06-17

## 2019-04-17 ASSESSMENT — PAIN SCALES - GENERAL: PAINLEVEL: NO PAIN (0)

## 2019-04-17 ASSESSMENT — MIFFLIN-ST. JEOR: SCORE: 1340.65

## 2019-04-17 NOTE — NURSING NOTE
"Oncology Rooming Note    April 17, 2019 2:25 PM   Vianey Manning is a 59 year old female who presents for:    Chief Complaint   Patient presents with     Oncology Clinic Visit     Adenocarcinoma, lung      Initial Vitals: /70   Pulse 82   Temp 97.5  F (36.4  C) (Oral)   Resp 16   Ht 1.626 m (5' 4\")   Wt 77.6 kg (171 lb)   SpO2 96%   BMI 29.35 kg/m   Estimated body mass index is 29.35 kg/m  as calculated from the following:    Height as of this encounter: 1.626 m (5' 4\").    Weight as of this encounter: 77.6 kg (171 lb). Body surface area is 1.87 meters squared.  No Pain (0) Comment: Data Unavailable   No LMP recorded. Patient is postmenopausal.  Allergies reviewed: Yes  Medications reviewed: Yes    Medications: Medication refills not needed today.  Pharmacy name entered into 24tidy: [facility] [address] [place] [address] [address] AT [facility] [address]    Clinical concerns: No New Concerns    TESS Gonzalez  "

## 2019-04-17 NOTE — LETTER
4/17/2019       RE: Vianey Manning  [address] [facility] [facility] [address]     Dear Colleague,    Thank you for referring your patient, Vianey Manning, to the [facility] CANCER CLINIC. Please see a copy of my visit note below.    THORACIC SURGERY FOLLOW UP VISIT    Dear Dr. Bordenue,  I saw Ms. Manning in follow-up today. The clinical summary follows:     PREOP DIAGNOSIS   LLL adenocarcinoma    PROCEDURE   LVATS left lower lobe completion lobectomy, mediastinal lymphadenoctomy    DATE OF PROCEDURE  12/4/2015    HISTOPATHOLOGY   FINAL DIAGNOSIS:   Lung, left, lower lobe, wedge resection:   - Invasive adenocarcinoma, well differentiated        - Specimen integrity: Intact        - Histologic type: Adenocarcinoma with mixed acinar (60%),   papillary (25%), and lipidic (15%) patterns of growth        - Histologic grade: Well differentiated        - Tumor site: Left lower lobe        - Tumor size: 1.4 cm in greatest dimension             - invasive focus: More than 0.5 cm        - Tumor focality: Unifocal        - Visceral pleural invasion: No invasion identified (PL0)        - Extra-pulmonary tumor extension: Not Applicable        - Margins: Negative             - Bronchial margin: Not applicable.             - Vascular margin: Not applicable.             - Parenchymal margin: Negative                  - 1.5 cm from the parenchymal resection margin        - Treatment effect: Not applicable        - Lymphovascular invasion: Not identified        - Large vein and artery involvement: Not identified        - Lymph nodes: Not submitted        - Pathologic staging:  pT1aNx   - Non-neoplastic lung with respiratory bronchiolit         INTERVAL STUDIES  Chest CT scan today-    IMPRESSION:   1. Postoperative changes of left lower lobectomy without evidence of  recurrence or metastatic disease. No new or suspicious pulmonary  nodules.  2. Sequela of chronic granulomatous disease    SUBJECTIVE   Vianey  reports feeling well with no recent illnesses.   She still is being followed by ENT for cord dysfunction post-op and will be seeing  in a week.   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    IMPRESSION No diagnosis found.  59 year-old female here for surveillance after lung cancer surgery.   I reviewed her chest CT scan and will await formal reading.   I expect the recommendation will be to repeat a chest CT in 1 year.      PLAN  I spent a total of 30 minutes with Ms. Vianey Manning and her , more than 50% of which were spent in counseling, coordination of care, and face-to-face time. I reviewed the plan as follows:  Release chest CT results to [facility], with expectation to arrange repeat scan in 1 year.  1. Necessary Tests & Appointments: Chest CT    All questions were answered and the patient and present family were in agreement with the plan.  I appreciate the opportunity to participate in the care of your patient and will keep you updated.  Sincerely,  SCOT French, CNS    Answers for HPI/ROS submitted by the patient on 4/17/2019   General Symptoms: No  Skin Symptoms: No  HENT Symptoms: No  EYE SYMPTOMS: No  HEART SYMPTOMS: No  LUNG SYMPTOMS: No  INTESTINAL SYMPTOMS: No  URINARY SYMPTOMS: No  GYNECOLOGIC SYMPTOMS: No  BREAST SYMPTOMS: No  SKELETAL SYMPTOMS: No  BLOOD SYMPTOMS: No  NERVOUS SYSTEM SYMPTOMS: No  MENTAL HEALTH SYMPTOMS: No      Again, thank you for allowing me to participate in the care of your patient.      Sincerely,    SCOT Napier CNS

## 2019-04-17 NOTE — PATIENT INSTRUCTIONS
I will release chest CT results to [employer] when available    Expect to recommend a 1 year f/u with repeat chest CT/ appointment with me.

## 2019-04-17 NOTE — PROGRESS NOTES
THORACIC SURGERY FOLLOW UP VISIT    Dear Dr. Colleague,  I saw Ms. Manning in follow-up today. The clinical summary follows:     PREOP DIAGNOSIS   LLL adenocarcinoma    PROCEDURE   LVATS left lower lobe completion lobectomy, mediastinal lymphadenoctomy    DATE OF PROCEDURE  12/4/2015    HISTOPATHOLOGY   FINAL DIAGNOSIS:   Lung, left, lower lobe, wedge resection:   - Invasive adenocarcinoma, well differentiated        - Specimen integrity: Intact        - Histologic type: Adenocarcinoma with mixed acinar (60%),   papillary (25%), and lipidic (15%) patterns of growth        - Histologic grade: Well differentiated        - Tumor site: Left lower lobe        - Tumor size: 1.4 cm in greatest dimension             - invasive focus: More than 0.5 cm        - Tumor focality: Unifocal        - Visceral pleural invasion: No invasion identified (PL0)        - Extra-pulmonary tumor extension: Not Applicable        - Margins: Negative             - Bronchial margin: Not applicable.             - Vascular margin: Not applicable.             - Parenchymal margin: Negative                  - 1.5 cm from the parenchymal resection margin        - Treatment effect: Not applicable        - Lymphovascular invasion: Not identified        - Large vein and artery involvement: Not identified        - Lymph nodes: Not submitted        - Pathologic staging:  pT1aNx   - Non-neoplastic lung with respiratory bronchiolit         INTERVAL STUDIES  Chest CT scan today-    IMPRESSION:   1. Postoperative changes of left lower lobectomy without evidence of  recurrence or metastatic disease. No new or suspicious pulmonary  nodules.  2. Sequela of chronic granulomatous disease    SUBJECTIVE   Vianey reports feeling well with no recent illnesses.   She still is being followed by ENT for cord dysfunction post-op and will be seeing  in a week.   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    IMPRESSION No diagnosis found.  59  year-old female here for surveillance after lung cancer surgery.   I reviewed her chest CT scan and will await formal reading.   I expect the recommendation will be to repeat a chest CT in 1 year.      PLAN  I spent a total of 30 minutes with Ms. Vianey Manning and her , more than 50% of which were spent in counseling, coordination of care, and face-to-face time. I reviewed the plan as follows:  Release chest CT results to [facility], with expectation to arrange repeat scan in 1 year.  1. Necessary Tests & Appointments: Chest CT    All questions were answered and the patient and present family were in agreement with the plan.  I appreciate the opportunity to participate in the care of your patient and will keep you updated.  Sincerely,  SCOT French, CNS    Answers for HPI/ROS submitted by the patient on 4/17/2019   General Symptoms: No  Skin Symptoms: No  HENT Symptoms: No  EYE SYMPTOMS: No  HEART SYMPTOMS: No  LUNG SYMPTOMS: No  INTESTINAL SYMPTOMS: No  URINARY SYMPTOMS: No  GYNECOLOGIC SYMPTOMS: No  BREAST SYMPTOMS: No  SKELETAL SYMPTOMS: No  BLOOD SYMPTOMS: No  NERVOUS SYSTEM SYMPTOMS: No  MENTAL HEALTH SYMPTOMS: No

## 2019-04-22 ENCOUNTER — OFFICE VISIT (OUTPATIENT)
Dept: OTOLARYNGOLOGY | Facility: CLINIC | Age: 59
End: 2019-04-22
Payer: COMMERCIAL

## 2019-04-22 VITALS
BODY MASS INDEX: 30.9 KG/M2 | SYSTOLIC BLOOD PRESSURE: 133 MMHG | WEIGHT: 181 LBS | HEIGHT: 64 IN | RESPIRATION RATE: 16 BRPM | HEART RATE: 81 BPM | DIASTOLIC BLOOD PRESSURE: 77 MMHG

## 2019-04-22 DIAGNOSIS — J38.00 VOCAL CORD PARALYSIS: Primary | ICD-10-CM

## 2019-04-22 ASSESSMENT — PAIN SCALES - GENERAL: PAINLEVEL: NO PAIN (0)

## 2019-04-22 ASSESSMENT — MIFFLIN-ST. JEOR: SCORE: 1386.01

## 2019-04-22 NOTE — NURSING NOTE
Chief Complaint   Patient presents with     RECHECK     follow up regarding vocal cord. experienced difficulty talking for a few months, feels better now.     Therese Mayo LPN

## 2019-04-22 NOTE — LETTER
4/22/2019       RE: Vianey Manning  [address] [facility] [address]     Dear Colleague,    Thank you for referring your patient, Vianey Manning, to the [facility] EAR NO[facility]AT at [facility]. Please see a copy of my visit note below.    HISTORY OF PRESENT ILLNESS:  Ms. Manning is back to see us again today.  She has a history of left vocal cord paralysis after treatment of left lung adenocarcinoma.  She has been doing well from the standpoint of her cancer with no evidence of recurrence on CT scan.  She had noted recently that her vocal quality has been somewhat diminished.  We had done injection thyroplasty in the past with long-term improvement thus far.  She states that while her vocal quality was diminished for a while, it is now back and improved.  She denies any dysphagia.      PHYSICAL EXAMINATION:  A 59-year-old woman in no acute distress.  Normal mood, normal affect, normal ability to communicate.  Alert and appropriate.  Head is normocephalic.  Cranial nerve VII is House-Brackmann I out of VI bilaterally.  Breathing without any difficulty or stridor.  Eyes are anicteric.  Skin of the head and neck appears normal.      PROCEDURE:  Examination of the larynx is then performed.  First verbal consent was obtained, and the nose is sprayed with lidocaine and Afrin.  Flexible laryngoscopy is performed.  Nasopharynx is normal.  Oropharynx is normal.  Hypopharynx is normal.  Larynx shows left vocal cord is fixed in the paramedian position with some arytenoid hooding.  Movement of the right cord is good but still with some mild posterior glottic gap.      ASSESSMENT AND PLAN:  A 59-year-old with left vocal fold paralysis but currently with a decent voice still.  We discussed risks and benefits of thyroplasty.  She currently is satisfied with how things are going and therefore, we will follow-up with her should her voice get worse again in the future.       SJ/ms     Sincerely,    Mikey Encarnacion MD

## 2019-04-22 NOTE — PATIENT INSTRUCTIONS
You were seen in the ENT clinic today with Dr. Encarnacion    You may follow up in clinic as needed.      Please call our clinic for any questions, concerns, and/or worsening symptoms.      Clinic #[phone]       Option 1 for scheduling.    Thank you for allowing us to be apart of your care!    Brenda CARDOSO RNCC    If you need to reach me my direct line is: [phone]

## 2019-04-22 NOTE — PROGRESS NOTES
HISTORY OF PRESENT ILLNESS:  Ms. Manning is back to see us again today.  She has a history of left vocal cord paralysis after treatment of left lung adenocarcinoma.  She has been doing well from the standpoint of her cancer with no evidence of recurrence on CT scan.  She had noted recently that her vocal quality has been somewhat diminished.  We had done injection thyroplasty in the past with long-term improvement thus far.  She states that while her vocal quality was diminished for a while, it is now back and improved.  She denies any dysphagia.      PHYSICAL EXAMINATION:  A 59-year-old woman in no acute distress.  Normal mood, normal affect, normal ability to communicate.  Alert and appropriate.  Head is normocephalic.  Cranial nerve VII is House-Brackmann I out of VI bilaterally.  Breathing without any difficulty or stridor.  Eyes are anicteric.  Skin of the head and neck appears normal.      PROCEDURE:  Examination of the larynx is then performed.  First verbal consent was obtained, and the nose is sprayed with lidocaine and Afrin.  Flexible laryngoscopy is performed.  Nasopharynx is normal.  Oropharynx is normal.  Hypopharynx is normal.  Larynx shows left vocal cord is fixed in the paramedian position with some arytenoid hooding.  Movement of the right cord is good but still with some mild posterior glottic gap.      ASSESSMENT AND PLAN:  A 59-year-old with left vocal fold paralysis but currently with a decent voice still.  We discussed risks and benefits of thyroplasty.  She currently is satisfied with how things are going and therefore, we will follow-up with her should her voice get worse again in the future.      SJ/ms

## 2020-03-02 ENCOUNTER — HEALTH MAINTENANCE LETTER (OUTPATIENT)
Age: 60
End: 2020-03-02

## 2020-07-08 ENCOUNTER — ANCILLARY PROCEDURE (OUTPATIENT)
Dept: CT IMAGING | Facility: CLINIC | Age: 60
End: 2020-07-08
Attending: CLINICAL NURSE SPECIALIST
Payer: COMMERCIAL

## 2020-07-08 DIAGNOSIS — C34.90 MALIGNANT NEOPLASM OF LUNG, UNSPECIFIED LATERALITY, UNSPECIFIED PART OF LUNG (H): Primary | ICD-10-CM

## 2020-07-08 DIAGNOSIS — C34.90 MALIGNANT NEOPLASM OF LUNG, UNSPECIFIED LATERALITY, UNSPECIFIED PART OF LUNG (H): ICD-10-CM

## 2020-12-14 ENCOUNTER — HEALTH MAINTENANCE LETTER (OUTPATIENT)
Age: 60
End: 2020-12-14

## 2021-04-18 ENCOUNTER — HEALTH MAINTENANCE LETTER (OUTPATIENT)
Age: 61
End: 2021-04-18

## 2021-07-27 ENCOUNTER — ANCILLARY PROCEDURE (OUTPATIENT)
Dept: CT IMAGING | Facility: CLINIC | Age: 61
End: 2021-07-27
Attending: CLINICAL NURSE SPECIALIST
Payer: COMMERCIAL

## 2021-07-27 DIAGNOSIS — C34.90 MALIGNANT NEOPLASM OF LUNG, UNSPECIFIED LATERALITY, UNSPECIFIED PART OF LUNG (H): ICD-10-CM

## 2021-07-27 PROCEDURE — 71250 CT THORAX DX C-: CPT | Mod: GC | Performed by: RADIOLOGY

## 2021-07-28 ENCOUNTER — VIRTUAL VISIT (OUTPATIENT)
Dept: SURGERY | Facility: CLINIC | Age: 61
End: 2021-07-28
Attending: CLINICAL NURSE SPECIALIST
Payer: COMMERCIAL

## 2021-07-28 DIAGNOSIS — C34.90 MALIGNANT NEOPLASM OF LUNG, UNSPECIFIED LATERALITY, UNSPECIFIED PART OF LUNG (H): Primary | ICD-10-CM

## 2021-07-28 PROCEDURE — 99212 OFFICE O/P EST SF 10 MIN: CPT | Mod: TEL | Performed by: CLINICAL NURSE SPECIALIST

## 2021-07-28 NOTE — PATIENT INSTRUCTIONS
Our schedulers will contact you to arrange a chest CT scan in approximately 1 year.    Call with any concerns or questions.

## 2021-07-28 NOTE — LETTER
7/28/2021         RE: Vianey Manning  [address] [facility] [address]        Dear Colleague,    Thank you for referring your patient, Vianey Manning, to the [facility] CANCER CLINIC. Please see a copy of my visit note below.    Vianey is a 61 year old who is being evaluated via a billable telephone visit.      What phone number would you like to be contacted at? [phone]  How would you like to obtain your AVS? Mail a copy     TESS Arshad    Phone call duration: 15 minutes  ~~~~~~~~~~~~~~~~~~~~~~~~~~~~~~~~~~~~~~~~~~~~~~  Thoracic Surgery Follow-up (Virtual visit)    PREOP DIAGNOSIS   LLL adenocarcinoma     PROCEDURE   LVATS left lower lobe completion lobectomy, mediastinal lymphadenoctomy     DATE OF PROCEDURE  12/4/2015     HISTOPATHOLOGY   FINAL DIAGNOSIS:   Lung, left, lower lobe, wedge resection:   - Invasive adenocarcinoma, well differentiated        - Specimen integrity: Intact        - Histologic type: Adenocarcinoma with mixed acinar (60%),   papillary (25%), and lipidic (15%) patterns of growth        - Histologic grade: Well differentiated        - Tumor site: Left lower lobe        - Tumor size: 1.4 cm in greatest dimension             - invasive focus: More than 0.5 cm        - Tumor focality: Unifocal        - Visceral pleural invasion: No invasion identified (PL0)        - Extra-pulmonary tumor extension: Not Applicable        - Margins: Negative             - Bronchial margin: Not applicable.             - Vascular margin: Not applicable.             - Parenchymal margin: Negative                  - 1.5 cm from the parenchymal resection margin        - Treatment effect: Not applicable        - Lymphovascular invasion: Not identified        - Large vein and artery involvement: Not identified        - Lymph nodes: Not submitted        - Pathologic staging:  pT1aNx   - Non-neoplastic lung with respiratory bronchiolit           INTERVAL STUDIES  Chest  CT scan yesterday-    IMPRESSION:   1. Postoperative changes of left lower lobectomy without evidence of  recurrence or metastatic disease. No new or suspicious pulmonary  nodules.  2. Sequela of chronic granulomatous disease     BRENNAN Winters reports feeling well with no recent illnesses.  She is returning to [place] this fall and is attending a training session today but stepped out to hallway to talk.   She has no concerns or questions.   She did say she is fully vaccinated against the Covid-19 virus and is cautious in groups, still masking when she feels it is needed.       IMPRESSION Adenocarcinoma left lower lung  59 year-old female here for surveillance after lung cancer surgery.   I reviewed her chest CT scan with her.    PLAN  I spent a total of 30 minutes with Ms. Vianey Manning and her , more than 50% of which were spent in counseling, coordination of care, and face-to-face time. I reviewed the plan as follows:    1. Necessary Tests & Appointments: Chest CT in 1 year     All questions were answered and the patient and present family were in agreement with the plan.  I appreciate the opportunity to participate in the care of your patient and will keep you updated.  Sincerely,    SCOT French, CNS      Again, thank you for allowing me to participate in the care of your patient.        Sincerely,        SCOT Napier CNS

## 2021-07-28 NOTE — PROGRESS NOTES
Vianey is a 61 year old who is being evaluated via a billable telephone visit.      What phone number would you like to be contacted at? [phone]  How would you like to obtain your AVS? Mail a copy     TESS Arshad    Phone call duration: 15 minutes  ~~~~~~~~~~~~~~~~~~~~~~~~~~~~~~~~~~~~~~~~~~~~~~  Thoracic Surgery Follow-up (Virtual visit)    PREOP DIAGNOSIS   LLL adenocarcinoma     PROCEDURE   LVATS left lower lobe completion lobectomy, mediastinal lymphadenoctomy     DATE OF PROCEDURE  12/4/2015     HISTOPATHOLOGY   FINAL DIAGNOSIS:   Lung, left, lower lobe, wedge resection:   - Invasive adenocarcinoma, well differentiated        - Specimen integrity: Intact        - Histologic type: Adenocarcinoma with mixed acinar (60%),   papillary (25%), and lipidic (15%) patterns of growth        - Histologic grade: Well differentiated        - Tumor site: Left lower lobe        - Tumor size: 1.4 cm in greatest dimension             - invasive focus: More than 0.5 cm        - Tumor focality: Unifocal        - Visceral pleural invasion: No invasion identified (PL0)        - Extra-pulmonary tumor extension: Not Applicable        - Margins: Negative             - Bronchial margin: Not applicable.             - Vascular margin: Not applicable.             - Parenchymal margin: Negative                  - 1.5 cm from the parenchymal resection margin        - Treatment effect: Not applicable        - Lymphovascular invasion: Not identified        - Large vein and artery involvement: Not identified        - Lymph nodes: Not submitted        - Pathologic staging:  pT1aNx   - Non-neoplastic lung with respiratory bronchiolit           INTERVAL STUDIES  Chest CT scan yesterday-    IMPRESSION:   1. Postoperative changes of left lower lobectomy without evidence of  recurrence or metastatic disease. No new or suspicious pulmonary  nodules.  2. Sequela of chronic granulomatous disease     SUBJECTIVE   Vianey reports feeling well  with no recent illnesses.  She is returning to [employer] this fall and is attending a training session today but stepped out to hallway to talk.   She has no concerns or questions.   She did say she is fully vaccinated against the Covid-19 virus and is cautious in groups, still masking when she feels it is needed.       IMPRESSION Adenocarcinoma left lower lung  59 year-old female here for surveillance after lung cancer surgery.   I reviewed her chest CT scan with her.    PLAN  I spent a total of 30 minutes with Ms. Vianey Manning and her , more than 50% of which were spent in counseling, coordination of care, and face-to-face time. I reviewed the plan as follows:    1. Necessary Tests & Appointments: Chest CT in 1 year     All questions were answered and the patient and present family were in agreement with the plan.  I appreciate the opportunity to participate in the care of your patient and will keep you updated.  Sincerely,    SCOT French, CNS

## 2021-10-03 ENCOUNTER — HEALTH MAINTENANCE LETTER (OUTPATIENT)
Age: 61
End: 2021-10-03

## 2022-03-19 ENCOUNTER — HEALTH MAINTENANCE LETTER (OUTPATIENT)
Age: 62
End: 2022-03-19

## 2022-05-14 ENCOUNTER — HEALTH MAINTENANCE LETTER (OUTPATIENT)
Age: 62
End: 2022-05-14

## 2022-07-27 ENCOUNTER — VIRTUAL VISIT (OUTPATIENT)
Dept: SURGERY | Facility: CLINIC | Age: 62
End: 2022-07-27
Attending: CLINICAL NURSE SPECIALIST
Payer: COMMERCIAL

## 2022-07-27 ENCOUNTER — ANCILLARY PROCEDURE (OUTPATIENT)
Dept: CT IMAGING | Facility: CLINIC | Age: 62
End: 2022-07-27
Attending: CLINICAL NURSE SPECIALIST
Payer: COMMERCIAL

## 2022-07-27 DIAGNOSIS — C34.90 MALIGNANT NEOPLASM OF LUNG, UNSPECIFIED LATERALITY, UNSPECIFIED PART OF LUNG (H): Primary | ICD-10-CM

## 2022-07-27 DIAGNOSIS — C34.90 MALIGNANT NEOPLASM OF LUNG, UNSPECIFIED LATERALITY, UNSPECIFIED PART OF LUNG (H): ICD-10-CM

## 2022-07-27 PROCEDURE — 71250 CT THORAX DX C-: CPT | Performed by: RADIOLOGY

## 2022-07-27 PROCEDURE — 99213 OFFICE O/P EST LOW 20 MIN: CPT | Mod: TEL | Performed by: CLINICAL NURSE SPECIALIST

## 2022-07-27 NOTE — NURSING NOTE
Patient denies any changes since check-in regarding medication and allergies and states all information entered during check-in remains accurate.    Shawna Jacobs, Visit Facilitator/MA.

## 2022-07-27 NOTE — PROGRESS NOTES
Vianey is a 62 year old who is being evaluated via a billable telephone visit.      What phone number would you like to be contacted at? [phone]  How would you like to obtain your AVS? [email or web address]  Phone call duration: 20 minutes    Shawna Jacobs Visit Brittany/MA.    THORACIC SURGERY FOLLOW UP VISIT      I spoke by telephone to Ms. Manning in follow-up today. The clinical summary follows:     PREOP DIAGNOSIS   LLL adenocarcinoma     PROCEDURE   LVATS left lower lobe completion lobectomy, mediastinal lymphadenoctomy     DATE OF PROCEDURE  12/4/2015     HISTOPATHOLOGY   FINAL DIAGNOSIS:   Lung, left, lower lobe, wedge resection:   - Invasive adenocarcinoma, well differentiated        - Specimen integrity: Intact        - Histologic type: Adenocarcinoma with mixed acinar (60%),   papillary (25%), and lipidic (15%) patterns of growth        - Histologic grade: Well differentiated        - Tumor site: Left lower lobe        - Tumor size: 1.4 cm in greatest dimension             - invasive focus: More than 0.5 cm        - Tumor focality: Unifocal        - Visceral pleural invasion: No invasion identified (PL0)        - Extra-pulmonary tumor extension: Not Applicable        - Margins: Negative             - Bronchial margin: Not applicable.             - Vascular margin: Not applicable.             - Parenchymal margin: Negative                  - 1.5 cm from the parenchymal resection margin        - Treatment effect: Not applicable        - Lymphovascular invasion: Not identified        - Large vein and artery involvement: Not identified        - Lymph nodes: Not submitted        - Pathologic staging:  pT1aNx   - Non-neoplastic lung with respiratory bronchiolit           INTERVAL STUDIES  Chest CT scan today:    SUBJECTIVE   I talked to Vianey who reports being in good health with no new concerns.   She and her  are currently driving to [place] for a week to visit family.         IMPRESSION   Vianey is 7  "years out from her cancer surgery and is feeling \"great\".   She plans to return to [facility] in September.     We discussed her chest CT scan, done earlier today and not yet formally read.    I do not see any changes and recommend another chest CT scan in 1 year unless the reading indicates anything different.    PLAN  I spent 20 min on the date of the encounter in chart review, patient visit, review of tests, documentation and/or discussion with other providers about the issues documented above. I reviewed the plan as follows:  Call or return with any new changes  1. Necessary Tests & Appointments: Chest CT and follow-up in 1 year  2. Pain Control Plan: n/a  3. Anticoagulation Plan: n/a  4. Smoking Cessation: n/a    All questions were answered and the patient and present family were in agreement with the plan.  I appreciate the opportunity to participate in the care of your patient and will keep you updated.  Sincerely,  SCOT French, CNS        "

## 2022-07-27 NOTE — LETTER
7/27/2022         RE: Vianey Manning  [address] [facility] [address]        Dear Colleague,    Thank you for referring your patient, Vianey Manning, to the [facility]. Please see a copy of my visit note below.    Vianey is a 62 year old who is being evaluated via a billable telephone visit.      What phone number would you like to be contacted at? [phone]  How would you like to obtain your AVS? [employer]  Phone call duration: 20 minutes    Shawna Jacobs Visit Brittany/MA.    THORACIC SURGERY FOLLOW UP VISIT      I spoke by telephone to Ms. Manning in follow-up today. The clinical summary follows:     PREOP DIAGNOSIS   LLL adenocarcinoma     PROCEDURE   LVATS left lower lobe completion lobectomy, mediastinal lymphadenoctomy     DATE OF PROCEDURE  12/4/2015     HISTOPATHOLOGY   FINAL DIAGNOSIS:   Lung, left, lower lobe, wedge resection:   - Invasive adenocarcinoma, well differentiated        - Specimen integrity: Intact        - Histologic type: Adenocarcinoma with mixed acinar (60%),   papillary (25%), and lipidic (15%) patterns of growth        - Histologic grade: Well differentiated        - Tumor site: Left lower lobe        - Tumor size: 1.4 cm in greatest dimension             - invasive focus: More than 0.5 cm        - Tumor focality: Unifocal        - Visceral pleural invasion: No invasion identified (PL0)        - Extra-pulmonary tumor extension: Not Applicable        - Margins: Negative             - Bronchial margin: Not applicable.             - Vascular margin: Not applicable.             - Parenchymal margin: Negative                  - 1.5 cm from the parenchymal resection margin        - Treatment effect: Not applicable        - Lymphovascular invasion: Not identified        - Large vein and artery involvement: Not identified        - Lymph nodes: Not submitted        - Pathologic staging:  pT1aNx   - Non-neoplastic lung with  "respiratory bronchiolit           INTERVAL STUDIES  Chest CT scan today:    SUBJECTIVE   I talked to Vianey who reports being in good health with no new concerns.   She and her  are currently driving to [place] for a week to visit family.         IMPRESSION   Vianey is 7 years out from her cancer surgery and is feeling \"great\".   She plans to return to [facility] in September.     We discussed her chest CT scan, done earlier today and not yet formally read.    I do not see any changes and recommend another chest CT scan in 1 year unless the reading indicates anything different.    PLAN  I spent 20 min on the date of the encounter in chart review, patient visit, review of tests, documentation and/or discussion with other providers about the issues documented above. I reviewed the plan as follows:  Call or return with any new changes  1. Necessary Tests & Appointments: Chest CT and follow-up in 1 year  2. Pain Control Plan: n/a  3. Anticoagulation Plan: n/a  4. Smoking Cessation: n/a    All questions were answered and the patient and present family were in agreement with the plan.  I appreciate the opportunity to participate in the care of your patient and will keep you updated.        Again, thank you for allowing me to participate in the care of your patient.      Sincerely,    SCOT Napier CNS    "

## 2022-09-04 ENCOUNTER — HEALTH MAINTENANCE LETTER (OUTPATIENT)
Age: 62
End: 2022-09-04

## 2023-06-03 ENCOUNTER — HEALTH MAINTENANCE LETTER (OUTPATIENT)
Age: 63
End: 2023-06-03

## 2023-06-20 NOTE — PROGRESS NOTES
THORACIC SURGERY FOLLOW UP VISIT    I saw Mrs. Manning in follow-up today. The clinical summary follows:     PREOP DIAGNOSIS   Left lower lobe adenocarcinoma  PROCEDURE   Left transcervical thorascopic wedge biopsy 12/14/2015  Thoracoscopic LEFT lower lobe completion lobectomy, mediastinal lymphadenectomy 01/25/2016      HISTOPATHOLOGY   Invasive well differentiated 1.4cm adenocarcinoma; negative margins (12/2015)  No residual adenocarcinoma with no evidence of lymph node metastasis, pT1aN0 (01/2016)    COMPLICATIONS  Left vocal cord palsy    INTERVAL STUDIES  CT chest: final report not available at time of clinic visit. To my review, there appears to be an area of ground glass nodularity in the anterior left lower lobe pleura. This could be atelectasis. I will follow up on the final report.    Past Medical History:   Diagnosis Date     Diverticulosis      Hoarseness 1/25/2016    Left vocal cord paralysis     Hx of colonic polyps      Hypercholesterolemia      Lung cancer (H)      Lung nodule      PONV (postoperative nausea and vomiting)       Past Surgical History:   Procedure Laterality Date     BIOPSY      breast     COLONOSCOPY       HC ABLATION, ENDOMETRIAL, THERMAL, W/O HYSTEROSCOPIC GUIDANCE       INJECT STEROID (LOCATION) Left 5/9/2016    Procedure: INJECT STEROID (LOCATION);  Surgeon: Mikey Encarnacion MD;  [facility]: [facility]     THORACOSCOPIC WEDGE RESECTION LUNG Left 1/25/2016    Procedure: THORACOSCOPIC WEDGE RESECTION LUNG;  Surgeon: Gabriel Herrera MD;  Location: [facility] OR     THORACOSCOPY Left 12/3/2015    Procedure: THORACOSCOPY;  Surgeon: Gabriel Herrera MD;  Location: [facility]     TRANSCERVICAL EXTENDED MEDIASTINAL LYMPHADENECTOMY Left 12/3/2015    Procedure: TRANSCERVICAL EXTENDED MEDIASTINAL LYMPHADENECTOMY;  Surgeon: Gabriel Herrera MD;  Location: [facility] OR     Social History     Socioeconomic History     Marital status:      Spouse name: Not on file     Number of children:  Not on file     Years of education: Not on file     Highest education level: Not on file   Occupational History     Not on file   Tobacco Use     Smoking status: Former     Packs/day: 0.50     Years: 20.00     Pack years: 10.00     Types: Cigarettes     Start date: 1/1/1998     Smokeless tobacco: Never     Tobacco comments:     since offically quitting 18 years ago, she does still have cigarette now and again, weekly it seems   Substance and Sexual Activity     Alcohol use: Yes     Alcohol/week: 6.0 - 8.0 standard drinks of alcohol     Comment: weekends  4 - 6 drinks., Not as much since lung surgery. 2016     Drug use: No     Sexual activity: Yes     Partners: Male     Birth control/protection: None   Other Topics Concern     Parent/sibling w/ CABG, MI or angioplasty before 65F 55M? Not Asked   Social History Narrative    The patient has a 20+ pk yr tobacco hx.  She has occassional tobacco use.  Alcohol use is 3-8 alcoholic drinks per week.  She denies use of recreational drugs.          She works as a . .          The patient is .  Has 3 children.        Hot Tub Exposure: NO    Recent Travel: NO     Hx of incarceration:  NO    Bird Exposure:   NO    Animal Exposure:  NO    Inhalation Exposure:  NO     Social Determinants of Health     Financial Resource Strain: Not on file   Food Insecurity: Not on file   Transportation Needs: Not on file   Physical Activity: Not on file   Stress: Not on file   Social Connections: Not on file   Intimate Partner Violence: Not on file   Housing Stability: Not on file      SUBJECTIVE   Vianey is doing well. She denies respiratory symptoms. She does get random tightness around her breast line that comes and goes without incident. This has happened since surgery. It does not limit her daily activities nor require pain medication.    OBJECTIVE  BP (!) 151/98   Pulse 83   Temp 98.1  F (36.7  C) (Oral)   Resp 18   Wt 86.5 kg (190 lb 9.6 oz)   SpO2 100%   BMI 32.72  kg/m       From a personal perspective, she is a .    IMPRESSION   63 year-old female status post transcervical thoracoscopic left lower lobe wedge resection, completion left lower lobectomy and mediastinal lymph node dissection for a pT1aN0 (stage IA1) non small cell lung cancer. She is here today with a lung cancer surveillance CT.    I reviewed the images from today's CT with her and discussed the area in the left lower lobe on the pleura and my thought that this may just be a small area of atelectasis. I will call her once I have reviewed the final radiology report.    PLAN  I spent 15 min on the date of the encounter in chart review, patient visit, review of tests, documentation and/or discussion with other providers about the issues documented above. I reviewed the plan as follows:  Follow up on final radiology report. If no concerns, repeat CT chest in 1 year  All questions were answered and the patient and present family were in agreement with the plan.  I appreciate the opportunity to participate in the care of your patient and will keep you updated.  Sincerely,

## 2023-06-21 ENCOUNTER — TELEPHONE (OUTPATIENT)
Dept: SURGERY | Facility: CLINIC | Age: 63
End: 2023-06-21

## 2023-06-21 ENCOUNTER — ONCOLOGY VISIT (OUTPATIENT)
Dept: SURGERY | Facility: CLINIC | Age: 63
End: 2023-06-21
Attending: CLINICAL NURSE SPECIALIST
Payer: COMMERCIAL

## 2023-06-21 ENCOUNTER — ANCILLARY PROCEDURE (OUTPATIENT)
Dept: CT IMAGING | Facility: CLINIC | Age: 63
End: 2023-06-21
Attending: CLINICAL NURSE SPECIALIST
Payer: COMMERCIAL

## 2023-06-21 VITALS
SYSTOLIC BLOOD PRESSURE: 151 MMHG | BODY MASS INDEX: 32.72 KG/M2 | HEART RATE: 83 BPM | RESPIRATION RATE: 18 BRPM | WEIGHT: 190.6 LBS | DIASTOLIC BLOOD PRESSURE: 98 MMHG | OXYGEN SATURATION: 100 % | TEMPERATURE: 98.1 F

## 2023-06-21 DIAGNOSIS — C34.90 MALIGNANT NEOPLASM OF LUNG, UNSPECIFIED LATERALITY, UNSPECIFIED PART OF LUNG (H): Primary | ICD-10-CM

## 2023-06-21 DIAGNOSIS — C34.90 MALIGNANT NEOPLASM OF LUNG, UNSPECIFIED LATERALITY, UNSPECIFIED PART OF LUNG (H): ICD-10-CM

## 2023-06-21 PROCEDURE — G0463 HOSPITAL OUTPT CLINIC VISIT: HCPCS | Performed by: CLINICAL NURSE SPECIALIST

## 2023-06-21 PROCEDURE — 71250 CT THORAX DX C-: CPT | Mod: GC | Performed by: RADIOLOGY

## 2023-06-21 PROCEDURE — 99213 OFFICE O/P EST LOW 20 MIN: CPT | Performed by: CLINICAL NURSE SPECIALIST

## 2023-06-21 RX ORDER — ASPIRIN 81 MG/1
81 TABLET ORAL DAILY
COMMUNITY

## 2023-06-21 ASSESSMENT — PAIN SCALES - GENERAL: PAINLEVEL: NO PAIN (0)

## 2023-06-21 NOTE — TELEPHONE ENCOUNTER
Called Vianey to let her know that the final radiology report is stable and there are no new or concerning findings.    Vianey appreciated the call.

## 2023-06-21 NOTE — LETTER
6/21/2023         RE: Vianey Manning  [address] [facility] [address]        Dear Colleague,    Thank you for referring your patient, Vianey Manning, to the [facility]. Please see a copy of my visit note below.    THORACIC SURGERY FOLLOW UP VISIT    I saw Mrs. Manning in follow-up today. The clinical summary follows:     PREOP DIAGNOSIS   Left lower lobe adenocarcinoma  PROCEDURE   Left transcervical thorascopic wedge biopsy 12/14/2015  Thoracoscopic LEFT lower lobe completion lobectomy, mediastinal lymphadenectomy 01/25/2016      HISTOPATHOLOGY   Invasive well differentiated 1.4cm adenocarcinoma; negative margins (12/2015)  No residual adenocarcinoma with no evidence of lymph node metastasis, pT1aN0 (01/2016)    COMPLICATIONS  Left vocal cord palsy    INTERVAL STUDIES  CT chest: final report not available at time of clinic visit. To my review, there appears to be an area of ground glass nodularity in the anterior left lower lobe pleura. This could be atelectasis. I will follow up on the final report.    Past Medical History:   Diagnosis Date    Diverticulosis     Hoarseness 1/25/2016    Left vocal cord paralysis    Hx of colonic polyps     Hypercholesterolemia     Lung cancer (H)     Lung nodule     PONV (postoperative nausea and vomiting)       Past Surgical History:   Procedure Laterality Date    BIOPSY      breast    COLONOSCOPY      HC ABLATION, ENDOMETRIAL, THERMAL, W/O HYSTEROSCOPIC GUIDANCE      INJECT STEROID (LOCATION) Left 5/9/2016    Procedure: INJECT STEROID (LOCATION);  Surgeon: Mikey Encarnacion MD;  [facility]    THORACOSCOPIC WEDGE RESECTION LUNG Left 1/25/2016    Procedure: THORACOSCOPIC WEDGE RESECTION LUNG;  Surgeon: Gabriel Herrera MD;  [facility]: [facility]    THORACOSCOPY Left 12/3/2015    Procedure: THORACOSCOPY;  Surgeon: Gabriel Herrera MD;  Location: [facility]    TRANSCERVICAL EXTENDED MEDIASTINAL LYMPHADENECTOMY Left  12/3/2015    Procedure: TRANSCERVICAL EXTENDED MEDIASTINAL LYMPHADENECTOMY;  Surgeon: Gabriel Herrera MD;  Location:  OR     Social History     Socioeconomic History    Marital status:      Spouse name: Not on file    Number of children: Not on file    Years of education: Not on file    Highest education level: Not on file   Occupational History    Not on file   Tobacco Use    Smoking status: Former     Packs/day: 0.50     Years: 20.00     Pack years: 10.00     Types: Cigarettes     Start date: 1/1/1998    Smokeless tobacco: Never    Tobacco comments:     since offically quitting 18 years ago, she does still have cigarette now and again, weekly it seems   Substance and Sexual Activity    Alcohol use: Yes     Alcohol/week: 6.0 - 8.0 standard drinks of alcohol     Comment: weekends  4 - 6 drinks., Not as much since lung surgery. 2016    Drug use: No    Sexual activity: Yes     Partners: Male     Birth control/protection: None   Other Topics Concern    Parent/sibling w/ CABG, MI or angioplasty before 65F 55M? Not Asked   Social History Narrative    The patient has a 20+ pk yr tobacco hx.  She has occassional tobacco use.  Alcohol use is 3-8 alcoholic drinks per week.  She denies use of recreational drugs.          She works as a . .          The patient is .  Has 3 children.        Hot Tub Exposure: NO    Recent Travel: NO     Hx of incarceration:  NO    Bird Exposure:   NO    Animal Exposure:  NO    Inhalation Exposure:  NO     Social Determinants of Health     Financial Resource Strain: Not on file   Food Insecurity: Not on file   Transportation Needs: Not on file   Physical Activity: Not on file   Stress: Not on file   Social Connections: Not on file   Intimate Partner Violence: Not on file   Housing Stability: Not on file      SUBJECTIVE   Vianey is doing well. She denies respiratory symptoms. She does get random tightness around her breast line that comes and goes without  incident. This has happened since surgery. It does not limit her daily activities nor require pain medication.    OBJECTIVE  BP (!) 151/98   Pulse 83   Temp 98.1  F (36.7  C) (Oral)   Resp 18   Wt 86.5 kg (190 lb 9.6 oz)   SpO2 100%   BMI 32.72 kg/m       From a personal perspective, she is a .    IMPRESSION   63 year-old female status post transcervical thoracoscopic left lower lobe wedge resection, completion left lower lobectomy and mediastinal lymph node dissection for a pT1aN0 (stage IA1) non small cell lung cancer. She is here today with a lung cancer surveillance CT.    I reviewed the images from today's CT with her and discussed the area in the left lower lobe on the pleura and my thought that this may just be a small area of atelectasis. I will call her once I have reviewed the final radiology report.    PLAN  I spent 15 min on the date of the encounter in chart review, patient visit, review of tests, documentation and/or discussion with other providers about the issues documented above. I reviewed the plan as follows:  Follow up on final radiology report. If no concerns, repeat CT chest in 1 year  All questions were answered and the patient and present family were in agreement with the plan.  I appreciate the opportunity to participate in the care of your patient and will keep you updated.  Sincerely,      SCOT Martinez CNS

## 2023-06-21 NOTE — LETTER
6/21/2023         RE: Vianey Manning  [address] [facility] [address]      THORACIC SURGERY FOLLOW UP VISIT    I saw Mrs. Manning in follow-up today. The clinical summary follows:     PREOP DIAGNOSIS   Left lower lobe adenocarcinoma  PROCEDURE   Left transcervical thorascopic wedge biopsy 12/14/2015  Thoracoscopic LEFT lower lobe completion lobectomy, mediastinal lymphadenectomy 01/25/2016      HISTOPATHOLOGY   Invasive well differentiated 1.4cm adenocarcinoma; negative margins (12/2015)  No residual adenocarcinoma with no evidence of lymph node metastasis, pT1aN0 (01/2016)    COMPLICATIONS  Left vocal cord palsy    INTERVAL STUDIES  CT chest: final report not available at time of clinic visit. To my review, there appears to be an area of ground glass nodularity in the anterior left lower lobe pleura. This could be atelectasis. I will follow up on the final report.    Past Medical History:   Diagnosis Date    Diverticulosis     Hoarseness 1/25/2016    Left vocal cord paralysis    Hx of colonic polyps     Hypercholesterolemia     Lung cancer (H)     Lung nodule     PONV (postoperative nausea and vomiting)       Past Surgical History:   Procedure Laterality Date    BIOPSY      breast    COLONOSCOPY      HC ABLATION, ENDOMETRIAL, THERMAL, W/O HYSTEROSCOPIC GUIDANCE      INJECT STEROID (LOCATION) Left 5/9/2016    Procedure: INJECT STEROID (LOCATION);  Surgeon: Mikey Encarnacion MD;  [facility]    THORACOSCOPIC WEDGE RESECTION LUNG Left 1/25/2016    Procedure: THORACOSCOPIC WEDGE RESECTION LUNG;  Surgeon: Gabriel Herrera MD;  Location: [facility]    THORACOSCOPY Left 12/3/2015    Procedure: THORACOSCOPY;  Surgeon: Gabriel Herrera MD;  [facility]    TRANSCERVICAL EXTENDED MEDIASTINAL LYMPHADENECTOMY Left 12/3/2015    Procedure: TRANSCERVICAL EXTENDED MEDIASTINAL LYMPHADENECTOMY;  Surgeon: Gabriel Herrera MD;  Location[facility]     Social History     Socioeconomic  History    Marital status:      Spouse name: Not on file    Number of children: Not on file    Years of education: Not on file    Highest education level: Not on file   Occupational History    Not on file   Tobacco Use    Smoking status: Former     Packs/day: 0.50     Years: 20.00     Pack years: 10.00     Types: Cigarettes     Start date: 1/1/1998    Smokeless tobacco: Never    Tobacco comments:     since offically quitting 18 years ago, she does still have cigarette now and again, weekly it seems   Substance and Sexual Activity    Alcohol use: Yes     Alcohol/week: 6.0 - 8.0 standard drinks of alcohol     Comment: weekends  4 - 6 drinks., Not as much since lung surgery. 2016    Drug use: No    Sexual activity: Yes     Partners: Male     Birth control/protection: None   Other Topics Concern    Parent/sibling w/ CABG, MI or angioplasty before 65F 55M? Not Asked   Social History Narrative    The patient has a 20+ pk yr tobacco hx.  She has occassional tobacco use.  Alcohol use is 3-8 alcoholic drinks per week.  She denies use of recreational drugs.          She works as a . .          The patient is .  Has 3 children.        Hot Tub Exposure: NO    Recent Travel: NO     Hx of incarceration:  NO    Bird Exposure:   NO    Animal Exposure:  NO    Inhalation Exposure:  NO     Social Determinants of Health     Financial Resource Strain: Not on file   Food Insecurity: Not on file   Transportation Needs: Not on file   Physical Activity: Not on file   Stress: Not on file   Social Connections: Not on file   Intimate Partner Violence: Not on file   Housing Stability: Not on file      SUBJECTIVE   Vianey is doing well. She denies respiratory symptoms. She does get random tightness around her breast line that comes and goes without incident. This has happened since surgery. It does not limit her daily activities nor require pain medication.    OBJECTIVE  BP (!) 151/98   Pulse 83   Temp 98.1  F (36.7   C) (Oral)   Resp 18   Wt 86.5 kg (190 lb 9.6 oz)   SpO2 100%   BMI 32.72 kg/m       From a personal perspective, she is a .    IMPRESSION   63 year-old female status post transcervical thoracoscopic left lower lobe wedge resection, completion left lower lobectomy and mediastinal lymph node dissection for a pT1aN0 (stage IA1) non small cell lung cancer. She is here today with a lung cancer surveillance CT.    I reviewed the images from today's CT with her and discussed the area in the left lower lobe on the pleura and my thought that this may just be a small area of atelectasis. I will call her once I have reviewed the final radiology report.    PLAN  I spent 15 min on the date of the encounter in chart review, patient visit, review of tests, documentation and/or discussion with other providers about the issues documented above. I reviewed the plan as follows:  Follow up on final radiology report. If no concerns, repeat CT chest in 1 year  All questions were answered and the patient and present family were in agreement with the plan.  I appreciate the opportunity to participate in the care of your patient and will keep you updated.    Sincerely,            SCOT Martinez CNS

## 2023-06-21 NOTE — NURSING NOTE
"Oncology Rooming Note    June 21, 2023 10:45 AM   Vianey Manning is a 63 year old female who presents for:    Chief Complaint   Patient presents with     Oncology Clinic Visit     Malignant neoplasm of lung, unspecified laterality, unspecified part of lung     Initial Vitals: BP (!) 151/98   Pulse 83   Temp 98.1  F (36.7  C) (Oral)   Resp 18   Wt 86.5 kg (190 lb 9.6 oz)   SpO2 100%   BMI 32.72 kg/m   Estimated body mass index is 32.72 kg/m  as calculated from the following:    Height as of 4/22/19: 1.626 m (5' 4\").    Weight as of this encounter: 86.5 kg (190 lb 9.6 oz). Body surface area is 1.98 meters squared.  No Pain (0) Comment: Data Unavailable   No LMP recorded. Patient is postmenopausal.  Allergies reviewed: Yes  Medications reviewed: Yes    Medications: Medication refills not needed today.  Pharm[facility] entered into [id number] [place] [address] AT [facility] [address]    Clinical concerns: 1 year followup       Lynne Hernandez CMA            "

## 2024-02-19 ENCOUNTER — MEDICAL CORRESPONDENCE (OUTPATIENT)
Dept: HEALTH INFORMATION MANAGEMENT | Facility: CLINIC | Age: 64
End: 2024-02-19
Payer: COMMERCIAL

## 2024-02-27 ENCOUNTER — TRANSFERRED RECORDS (OUTPATIENT)
Dept: HEALTH INFORMATION MANAGEMENT | Facility: CLINIC | Age: 64
End: 2024-02-27
Payer: COMMERCIAL

## 2024-02-28 ENCOUNTER — TRANSCRIBE ORDERS (OUTPATIENT)
Dept: OTHER | Age: 64
End: 2024-02-28

## 2024-02-28 DIAGNOSIS — Q31.8 VOCAL CORD ANOMALY: Primary | ICD-10-CM

## 2024-04-27 ENCOUNTER — HEALTH MAINTENANCE LETTER (OUTPATIENT)
Age: 64
End: 2024-04-27

## 2024-06-17 ENCOUNTER — ONCOLOGY VISIT (OUTPATIENT)
Dept: SURGERY | Facility: CLINIC | Age: 64
End: 2024-06-17
Attending: CLINICAL NURSE SPECIALIST
Payer: COMMERCIAL

## 2024-06-17 ENCOUNTER — ANCILLARY PROCEDURE (OUTPATIENT)
Dept: CT IMAGING | Facility: CLINIC | Age: 64
End: 2024-06-17
Attending: CLINICAL NURSE SPECIALIST
Payer: COMMERCIAL

## 2024-06-17 VITALS
SYSTOLIC BLOOD PRESSURE: 140 MMHG | DIASTOLIC BLOOD PRESSURE: 92 MMHG | RESPIRATION RATE: 16 BRPM | WEIGHT: 184.3 LBS | BODY MASS INDEX: 31.64 KG/M2 | HEART RATE: 69 BPM | OXYGEN SATURATION: 97 % | TEMPERATURE: 97.8 F

## 2024-06-17 DIAGNOSIS — C34.32 MALIGNANT NEOPLASM OF LOWER LOBE OF LEFT LUNG (H): Primary | ICD-10-CM

## 2024-06-17 DIAGNOSIS — C34.90 MALIGNANT NEOPLASM OF LUNG, UNSPECIFIED LATERALITY, UNSPECIFIED PART OF LUNG (H): ICD-10-CM

## 2024-06-17 PROCEDURE — 71260 CT THORAX DX C+: CPT | Mod: GC | Performed by: RADIOLOGY

## 2024-06-17 PROCEDURE — 99213 OFFICE O/P EST LOW 20 MIN: CPT | Performed by: CLINICAL NURSE SPECIALIST

## 2024-06-17 RX ORDER — TIRZEPATIDE 2.5 MG/.5ML
2.5 INJECTION, SOLUTION SUBCUTANEOUS
COMMUNITY
Start: 2024-02-19

## 2024-06-17 RX ORDER — IOPAMIDOL 755 MG/ML
93 INJECTION, SOLUTION INTRAVASCULAR ONCE
Status: COMPLETED | OUTPATIENT
Start: 2024-06-17 | End: 2024-06-17

## 2024-06-17 RX ADMIN — IOPAMIDOL 93 ML: 755 INJECTION, SOLUTION INTRAVASCULAR at 09:39

## 2024-06-17 ASSESSMENT — PAIN SCALES - GENERAL: PAINLEVEL: NO PAIN (0)

## 2024-06-17 NOTE — LETTER
6/17/2024      Vianey Manning  [address] [facility] [address]      Dear Colleague,    Thank you for referring your patient, Vianey Manning, to the [facility]. Please see a copy of my visit note below.    THORACIC SURGERY FOLLOW UP VISIT      I saw Mrs. Manning in follow-up today. The clinical summary follows:     PREOP DIAGNOSIS   Left lower lobe adenocarcinoma  PROCEDURE   Left transcervical thorascopic wedge biopsy 12/14/2015  Thoracoscopic LEFT lower lobe completion lobectomy, mediastinal lymphadenectomy 01/25/2016     HISTOPATHOLOGY   Invasive well differentiated 1.4cm adenocarcinoma; negative margins (12/2015)  No residual adenocarcinoma with no evidence of lymph node metastasis, pT1aN0 (01/2016)    COMPLICATIONS  Left vocal cord palsy    INTERVAL STUDIES  CT chest 06/17/2024: Stable postsurgical changes of left lower lobectomy. Stable pulmonary nodules as above. No radiographic evidence of new or worsening disease.    Past Medical History:   Diagnosis Date     Diverticulosis      Hoarseness 1/25/2016    Left vocal cord paralysis     Hx of colonic polyps      Hypercholesterolemia      Lung cancer (H)      Lung nodule      PONV (postoperative nausea and vomiting)       Past Surgical History:   Procedure Laterality Date     BIOPSY      breast     COLONOSCOPY       HC ABLATION, ENDOMETRIAL, THERMAL, W/O HYSTEROSCOPIC GUIDANCE       INJECT STEROID (LOCATION) Left 5/9/2016    Procedure: INJECT STEROID (LOCATION);  Surgeon: Mikey Encarnacion MD;  [facility]     THORACOSCOPIC WEDGE RESECTION LUNG Left 1/25/2016    Procedure: THORACOSCOPIC WEDGE RESECTION LUNG;  Surgeon: Gabriel Herrera MD;  [facility]     THORACOSCOPY Left 12/3/2015    Procedure: THORACOSCOPY;  Surgeon: Gabriel Herrera MD;  [facility]     TRANSCERVICAL EXTENDED MEDIASTINAL LYMPHADENECTOMY Left 12/3/2015    Procedure: TRANSCERVICAL EXTENDED MEDIASTINAL  LYMPHADENECTOMY;  Surgeon: Gabriel Herrera MD;  [facility]      Social History     Socioeconomic History     Marital status:      Spouse name: Not on file     Number of children: Not on file     Years of education: Not on file     Highest education level: Not on file   Occupational History     Not on file   Tobacco Use     Smoking status: Former     Current packs/day: 0.50     Average packs/day: 0.5 packs/day for 26.5 years (13.2 ttl pk-yrs)     Types: Cigarettes     Start date: 1/1/1998     Smokeless tobacco: Never     Tobacco comments:     since offically quitting 18 years ago, she does still have cigarette now and again, weekly it seems   Substance and Sexual Activity     Alcohol use: Yes     Alcohol/week: 6.0 - 8.0 standard drinks of alcohol     Comment: weekends  4 - 6 drinks., Not as much since lung surgery. 2016     Drug use: No     Sexual activity: Yes     Partners: Male     Birth control/protection: None   Other Topics Concern     Parent/sibling w/ CABG, MI or angioplasty before 65F 55M? Not Asked   Social History Narrative    The patient has a 20+ pk yr tobacco hx.  She has occassional tobacco use.  Alcohol use is 3-8 alcoholic drinks per week.  She denies use of recreational drugs.          She works as a . .          The patient is .  Has 3 children.        Hot Tub Exposure: NO    Recent Travel: NO     Hx of incarceration:  NO    Bird Exposure:   NO    Animal Exposure:  NO    Inhalation Exposure:  NO     Social Determinants of Health     Financial Resource Strain: Not on file   Food Insecurity: Not on file   Transportation Needs: Not on file   Physical Activity: Not on file   Stress: Not on file   Social Connections: Not on file   Interpersonal Safety: Not on file   Housing Stability: Not on file      SUBJECTIVE   Vianey is doing well. She denies recent illness, cough or shortness of breath.    OBJECTIVE  BP (!) 140/92 (BP Location: Right arm, Patient Position:  Sitting, Cuff Size: Adult Regular)   Pulse 69   Temp 97.8  F (36.6  C) (Oral)   Resp 16   Wt 83.6 kg (184 lb 4.8 oz)   SpO2 97%   BMI 31.64 kg/m       From a personal perspective, she is going to [place] in a couple of weeks to visit her brother who lives in [place]. She also went on an educational tour of [place] and [place] which she really enjoyed.    LUIS Winters is a 64 year-old female status post left transcervical thorascopic wedge biopsy, thoracoscopic LEFT lower lobe completion lobectomy and mediastinal lymphadenectomy for a pT1aN0 (stage IA1) non small cell lung cancer. She is here today for lung cancer surveillance.    PLAN  I spent 15 min on the date of the encounter in chart review, patient visit, review of tests, documentation and/or discussion with other providers about the issues documented above. I reviewed the plan as follows:  Follow up with me in 1 year with chest CT prior  1. Necessary Tests & Appointments: chest CT  All questions were answered and the patient and present family were in agreement with the plan.  I appreciate the opportunity to participate in the care of your patient and will keep you updated.  Sincerely,      Again, thank you for allowing me to participate in the care of your patient.        Sincerely,        SCOT Martinez CNS

## 2024-06-17 NOTE — PROGRESS NOTES
THORACIC SURGERY FOLLOW UP VISIT      I saw Mrs. Manning in follow-up today. The clinical summary follows:     PREOP DIAGNOSIS   Left lower lobe adenocarcinoma  PROCEDURE   Left transcervical thorascopic wedge biopsy 12/14/2015  Thoracoscopic LEFT lower lobe completion lobectomy, mediastinal lymphadenectomy 01/25/2016     HISTOPATHOLOGY   Invasive well differentiated 1.4cm adenocarcinoma; negative margins (12/2015)  No residual adenocarcinoma with no evidence of lymph node metastasis, pT1aN0 (01/2016)    COMPLICATIONS  Left vocal cord palsy    INTERVAL STUDIES  CT chest 06/17/2024: Stable postsurgical changes of left lower lobectomy. Stable pulmonary nodules as above. No radiographic evidence of new or worsening disease.    Past Medical History:   Diagnosis Date    Diverticulosis     Hoarseness 1/25/2016    Left vocal cord paralysis    Hx of colonic polyps     Hypercholesterolemia     Lung cancer (H)     Lung nodule     PONV (postoperative nausea and vomiting)       Past Surgical History:   Procedure Laterality Date    BIOPSY      breast    COLONOSCOPY      HC ABLATION, ENDOMETRIAL, THERMAL, W/O HYSTEROSCOPIC GUIDANCE      INJECT STEROID (LOCATION) Left 5/9/2016    Procedure: INJECT STEROID (LOCATION);  Surgeon: Mikey Encarnacion MD;  [facility]    THORACOSCOPIC WEDGE RESECTION LUNG Left 1/25/2016    Procedure: THORACOSCOPIC WEDGE RESECTION LUNG;  Surgeon: Gabriel Herrera MD;  [facility]    THORACOSCOPY Left 12/3/2015    Procedure: THORACOSCOPY;  Surgeon: Gabriel Herrera MD;  [facility]    TRANSCERVICAL EXTENDED MEDIASTINAL LYMPHADENECTOMY Left 12/3/2015    Procedure: TRANSCERVICAL EXTENDED MEDIASTINAL LYMPHADENECTOMY;  Surgeon: Gabriel Herrera MD;  [facility]      Social History     Socioeconomic History    Marital status:      Spouse name: Not on file    Number of children: Not on file    Years of education: Not on file    Highest education level: Not on file    Occupational History    Not on file   Tobacco Use    Smoking status: Former     Current packs/day: 0.50     Average packs/day: 0.5 packs/day for 26.5 years (13.2 ttl pk-yrs)     Types: Cigarettes     Start date: 1/1/1998    Smokeless tobacco: Never    Tobacco comments:     since offically quitting 18 years ago, she does still have cigarette now and again, weekly it seems   Substance and Sexual Activity    Alcohol use: Yes     Alcohol/week: 6.0 - 8.0 standard drinks of alcohol     Comment: weekends  4 - 6 drinks., Not as much since lung surgery. 2016    Drug use: No    Sexual activity: Yes     Partners: Male     Birth control/protection: None   Other Topics Concern    Parent/sibling w/ CABG, MI or angioplasty before 65F 55M? Not Asked   Social History Narrative    The patient has a 20+ pk yr tobacco hx.  She has occassional tobacco use.  Alcohol use is 3-8 alcoholic drinks per week.  She denies use of recreational drugs.          She works as a . .          The patient is .  Has 3 children.        Hot Tub Exposure: NO    Recent Travel: NO     Hx of incarceration:  NO    Bird Exposure:   NO    Animal Exposure:  NO    Inhalation Exposure:  NO     Social Determinants of Health     Financial Resource Strain: Not on file   Food Insecurity: Not on file   Transportation Needs: Not on file   Physical Activity: Not on file   Stress: Not on file   Social Connections: Not on file   Interpersonal Safety: Not on file   Housing Stability: Not on file      SUBJECTIVE   Vianey is doing well. She denies recent illness, cough or shortness of breath.    OBJECTIVE  BP (!) 140/92 (BP Location: Right arm, Patient Position: Sitting, Cuff Size: Adult Regular)   Pulse 69   Temp 97.8  F (36.6  C) (Oral)   Resp 16   Wt 83.6 kg (184 lb 4.8 oz)   SpO2 97%   BMI 31.64 kg/m       From a personal perspective, she is going to [place] in a couple of weeks to visit her brother who lives in [place]. She also went on an  educational tour of [place] and [place] which she really enjoyed.    LUIS Winters is a 64 year-old female status post left transcervical thorascopic wedge biopsy, thoracoscopic LEFT lower lobe completion lobectomy and mediastinal lymphadenectomy for a pT1aN0 (stage IA1) non small cell lung cancer. She is here today for lung cancer surveillance.    PLAN  I spent 15 min on the date of the encounter in chart review, patient visit, review of tests, documentation and/or discussion with other providers about the issues documented above. I reviewed the plan as follows:  Follow up with me in 1 year with chest CT prior  1. Necessary Tests & Appointments: chest CT  All questions were answered and the patient and present family were in agreement with the plan.  I appreciate the opportunity to participate in the care of your patient and will keep you updated.  Sincerely,

## 2024-06-17 NOTE — DISCHARGE INSTRUCTIONS

## 2024-06-17 NOTE — NURSING NOTE
"Oncology Rooming Note    June 17, 2024 11:03 AM   Vianey Manning is a 64 year old female who presents for:    Chief Complaint   Patient presents with    Oncology Clinic Visit     Malignant neoplasm of lung, unspecified laterality, unspecified part of lung     Initial Vitals: There were no vitals taken for this visit. Estimated body mass index is 32.72 kg/m  as calculated from the following:    Height as of 4/22/19: 1.626 m (5' 4\").    Weight as of 6/21/23: 86.5 kg (190 lb 9.6 oz). There is no height or weight on file to calculate BSA.  Data Unavailable Comment: Data Unavailable   No LMP recorded. Patient is postmenopausal.  Allergies reviewed: Yes  Medications reviewed: Yes    Medications: Medication refills not needed today.  Pharmacy name entered into [facility]: [facility] #[id number] [place] [address] AT [facility] [address]    Frailty Screening:   Is the patient here for a new oncology consult visit in cancer care? 2. No      Clinical concerns: Pt reports no new concerns.       Lucia Avery, EMT   "

## 2024-07-02 ENCOUNTER — TELEPHONE (OUTPATIENT)
Dept: OTOLARYNGOLOGY | Facility: CLINIC | Age: 64
End: 2024-07-02
Payer: COMMERCIAL

## 2024-07-02 NOTE — TELEPHONE ENCOUNTER
Patient confirmed scheduled appointment:  Date: 10/31  Time: 3:30pm  Visit type: New Throat  Provider: Dr. Sherwood  Location: [facility]  Testing/imaging:   Additional notes:

## 2024-07-06 ENCOUNTER — HEALTH MAINTENANCE LETTER (OUTPATIENT)
Age: 64
End: 2024-07-06

## 2024-09-06 NOTE — TELEPHONE ENCOUNTER
"FUTURE VISIT INFORMATION      FUTURE VISIT INFORMATION:  Date: 10/31/24  Time: 3:30 PM  Location: [facility] - ENT  REFERRAL INFORMATION:  Referring provider:    Referring providers clinic:    Reason for visit/diagnosis:  per patient vocal cord paralysis confirmed [facility] had for many years     RECORDS REQUESTED FROM      Clinic name Comments Records Status Imaging Status   [facility]  P: [phone]  2/27/24 referral/ note- Addison Cobos MD  Scanned in    [facility] Imaging 6/17/24 CT chest  2/25/16 XR chest  *more in PACS [facility] PACS   [facility] 4/22/19 note- Mikey Encarnacion MD     5/9/16 Left Vocal Cord Injection with Prolaryn Plus  [facility] 9/1/15 note- Lyle Haywood MD  [facility] Procedure 10/30/15 PFT Epic            \"Please notify/message CSS if patient completed outside imaging prior to scheduled appointment and/or any outside records that might have been missed at pre visit -Thanks\"  "

## 2024-10-28 NOTE — PROGRESS NOTES
[facility]   at the [facility]   Otolaryngology Clinic     Patient: Vianey Manning    MRN: [id number]    : 1960    Age/Gender: 64 year old female  Date of Service: 10/31/2024  Rendering Provider:   Jazmin Sherwood MD       Impression & Plan     IMPRESSION: Ms. 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 Uses amplifier at work.  - had temporary injection with Dr. Encarnacion in the past, which helped  - has difficulty getting loud  - scope evaluation shows left vocal fold paralysis with mild bowing, mild glottic gap, supraglottic hyperfunction  - fees shows: safe swallow, no penetration, no aspiration  -symptoms due to vocal fold paralysis  - discussed etiology of vocal fold paralysis in this case is due to lung surgery  - discussed prognosis of vocal fold paralysis permanent   - discussed options of treatment which include observation, voice therapy, temporary augmentation vs long term options  - discussed long term options of vocal fold augmentation with injection laryngoplasty with CAHA vs lipoinjection, framework surgery and reinnervation  - since she has pretty good glottic closure, would recommend stating with voice therapy at this time. discussed if symptoms are not resolved after voice therapy, would recommend trial of temporary augmentation  - given small glottic gap - she would benefit from voice therapy to begin with   - patient in agreement  Plan  - voice therapy     RETURN VISIT: after voice therapy    Referring Provider   PCP: Addison Cobos  Referring Physician: Addison Cobos MD  [address] [address]  Reason for Consultation   Left vocal fold paralysis  Dysphonia  History   HISTORY OF PRESENT  ILLNESS: Ms. Manning is a 64 year old female who presents to us today with dysphonia.         she presents today for evaluation. she reports:         Dysphonia:  Reports    Dysphagia:  denies    Dyspnea:    Reports     Coughing / Throat-clearing:  denies    GERD/LPRD:  denies    PAST MEDICAL HISTORY:   Past Medical History:   Diagnosis Date    Diverticulosis     Hoarseness 1/25/2016    Left vocal cord paralysis    Hx of colonic polyps     Hypercholesterolemia     Lung cancer (H)     Lung nodule     PONV (postoperative nausea and vomiting)        PAST SURGICAL HISTORY:   Past Surgical History:   Procedure Laterality Date    BIOPSY      breast    COLONOSCOPY      HC ABLATION, ENDOMETRIAL, THERMAL, W/O HYSTEROSCOPIC GUIDANCE      INJECT STEROID (LOCATION) Left 5/9/2016    Procedure: INJECT STEROID (LOCATION);  Surgeon: Mikey Encarnacion MD;  Location: [facility] OR    THORACOSCOPIC WEDGE RESECTION LUNG Left 1/25/2016    Procedure: THORACOSCOPIC WEDGE RESECTION LUNG;  Surgeon: Gabriel Herrera MD;  Location: [facility] OR    THORACOSCOPY Left 12/3/2015    Procedure: THORACOSCOPY;  Surgeon: Gabriel Herrera MD;  Location: [facility] OR    TRANSCERVICAL EXTENDED MEDIASTINAL LYMPHADENECTOMY Left 12/3/2015    Procedure: TRANSCERVICAL EXTENDED MEDIASTINAL LYMPHADENECTOMY;  Surgeon: Gabriel Herrera MD;  Location: [facility] OR       CURRENT MEDICATIONS:   Current Outpatient Medications:     aspirin 81 MG EC tablet, Take 81 mg by mouth daily, Disp: , Rfl:     SIMVASTATIN PO, Take 10 mg by mouth At Bedtime, Disp: , Rfl:     tirzepatide (MOUNJARO) 2.5 MG/0.5ML pen, Inject 2.5 mg Subcutaneous (Patient not taking: Reported on 6/17/2024), Disp: , Rfl:     ALLERGIES: Potassium bromide and Potassium dichromate    SOCIAL HISTORY:    Social History     Socioeconomic History    Marital status:      Spouse name: Not on file    Number of children: Not on file    Years of education: Not on file    Highest education level: Not on file    Occupational History    Not on file   Tobacco Use    Smoking status: Former     Current packs/day: 0.50     Average packs/day: 0.5 packs/day for 26.8 years (13.4 ttl pk-yrs)     Types: Cigarettes     Start date: 1/1/1998    Smokeless tobacco: Never    Tobacco comments:     since offically quitting 18 years ago, she does still have cigarette now and again, weekly it seems   Substance and Sexual Activity    Alcohol use: Yes     Alcohol/week: 6.0 - 8.0 standard drinks of alcohol     Comment: weekends  4 - 6 drinks., Not as much since lung surgery. 2016    Drug use: No    Sexual activity: Yes     Partners: Male     Birth control/protection: None   Other Topics Concern    Parent/sibling w/ CABG, MI or angioplasty before 65F 55M? Not Asked   Social History Narrative    The patient has a 20+ pk yr tobacco hx.  She has occassional tobacco use.  Alcohol use is 3-8 alcoholic drinks per week.  She denies use of recreational drugs.          She works as a . .          The patient is .  Has 3 children.        Hot Tub Exposure: NO    Recent Travel: NO     Hx of incarceration:  NO    Bird Exposure:   NO    Animal Exposure:  NO    Inhalation Exposure:  NO     Social Drivers of Health     Financial Resource Strain: Not on file   Food Insecurity: Not on file   Transportation Needs: Not on file   Physical Activity: Not on file   Stress: Not on file   Social Connections: Not on file   Interpersonal Safety: Not on file   Housing Stability: Not on file         FAMILY HISTORY:   Family History   Problem Relation Age of Onset    Other - See Comments Father         alcoholism, smoker    Cancer Father         Head/neck/throat    Hypertension Father     Substance Abuse Father     Esophageal Cancer Mother     Depression Mother     Mental Illness Mother     Osteoporosis Mother     Esophageal Cancer Sister         Small cell cancer    Other - See Comments Sister         alcoholism, smoker    Cancer Sister          Adenocarcinoma - unknown primary.     Other - See Comments Sister         alcoholism, smoker    Cancer Sister     Hypertension Sister     Hypertension Brother     Mental Illness Sister     Substance Abuse Sister      Non-contributory for problems with anesthesia    REVIEW OF SYSTEMS:   The patient was asked a 14 point review of systems regarding constitutional symptoms, eye symptoms, ears, nose, mouth, throat symptoms, cardiovascular symptoms, respiratory symptoms, gastrointestinal symptoms, genitourinary symptoms, musculoskeletal symptoms, integumentary symptoms, neurological symptoms, psychiatric symptoms, endocrine symptoms, hematologic/lymphatic symptoms, and allergic/ immunologic symptoms.   The pertinent factors have been included in the HPI and below.  Patient Supplied Answers to Review of Systems      11/12/2016     1:23 PM   [facility] ENT ROS   Ears, Nose, Throat Trouble swallowing    Hoarseness       Physical Examination   The patient underwent a physical examination as described below. The pertinent positive and negative findings are summarized after the description of the examination.  Constitutional: The patient's developmental and nutritional status was assessed. The patient's voice quality was assessed.  Head and Face: The head and face were inspected for deformities. The sinuses were palpated. The salivary glands were palpated. Facial muscle strength was assessed bilaterally.  Eyes: Extraocular movements and primary gaze alignment were assessed.  Ears, Nose, Mouth and Throat: The ears and nose were examined for deformities. The nasal septum, mucosa, and turbinates were inspected by anterior rhinoscopy. The lips, teeth, and gums were examined for abnormalities. The oral mucosa, tongue, palate, tonsils, lateral and posterior pharynx were inspected for the presence of asymmetry or mucosal lesions.    Neck: The tracheal position was noted, and the neck mass palpated to determine if there were any asymmetries,  abnormal neck masses, thyromegally, or thyroid nodules.  Respiratory: The nature of the breathing and chest expansion/symmetry was observed.  Cardiovascular: The patient was examined to determine the presence of any edema or jugular venous distension.  Abdomen: The contour of the abdomen was noted.  Lymphatic: The patient was examined for infraclavicular lymphadenopathy.  Musculoskeletal: The patient was inspected for the presence of skeletal deformities.  Extremities: The extremities were examined for any clubbing or cyanosis.  Skin: The skin was examined for inflammatory or neoplastic conditions.  Neurologic: The patient's orientation, mood, and affect were noted. The cranial nerve  functions were examined.  Other pertinent positive and negative findings on physical examination:      OC/OP: no lesions, uvula midline, soft palate elevates symmetrically   Neck: no lesions, no TH tenderness to palpation     All other physical examination findings were within normal limits and noncontributory.  Procedures   Video Laryngoscopy with Stroboscopy (CPT 96464)    Preoperative Diagnosis:  Dysphonia and throat symptoms  Postoperative Diagnosis: Dysphonia and throat symptoms  Indication:  Patient has new or persistent dysphonia and throat symptoms.  This requires evaluation by stroboscopy to fully delineate the laryngeal functioning; especially mucosal wave assessment, ultrasharp visualization of lesions on the vocal folds, and overall functioning of the larynx.  Details of Procedure: A 70 degree rigid telescopic laryngoscope or a distal chip flexible scope was used. The lighting was obtained via a light cable connected to a stroboscopic unit. The telescope was inserted either transorally or transnasally until the vocal folds could be visualized. The patient was instructed to sustain the vowel  ee  at a comfortable pitch and loudness as the voice was monitored by a microphone connected to a fundamental frequency tracker. This  circuit tracked vocal periodicity, allowing the light to flash in synchrony with the glottal cycles. A setting on the stroboscope was set to change the phase of light strobing with relation to the vocal fundamental frequency, producing an image of 1 to 2 glottal cycles every second. The video images were recorded for analysis. Use of the variable speed, slow and stop scan allowed careful study of pertinent segments of laryngeal function to increase accuracy of clinical assessments of function and dysfunction.  In particular, features of glottal closure, mucosal wave on the vocal fold cover and laryngeal symmetry were analyzed. Lastly, the patient was asked to phonate speech samples and auditory/perceptual evaluation of voice and resonance were performed.  The vocal quality was carefully evaluated for hoarseness, breathiness, loudness, phrase length and intelligibility to determine the source of dysphonia.    Findings:      B. LARYNGOVIDEOSTROBOSCOPY   Anatomic/Physiological Deviations:  LNC, Left vocal fold paralysis with mild bowing, mild glottic gap, supraglottic hyperfunction. Left vocal fold hypervascularity.   Mucosal wave: Right:  No restriction     Left: No restriction  Bilateral Vocal Fold Vibration: Asymmetric  Vocal Process: Right: No restriction    Left:  Marked restriction  Vocal Fold closure: Glottal gap    Complication(s): None  Disposition: Patient tolerated the procedure well                   Fiberoptic Endoscopic Evaluation of Swallowing (CPT 72941)  and Interpretation of Swallowing (CPT 80564)    Indications: See above notes for complete history and physical. Patient complains of dysphagia to both solids and liquids and/or there is suggestion on history and endoscopic exam of the presence of dysphagia causing medical complaints.  Swallowing evaluation is being performed to assess the presence and degree of dysphagia, and to recommend a safe diet.     Pulmonary Status:  No PNA   Current Diet:        "       regular                                        thin liquids      Consistency Amounts:  Thin Liquid: sip   Puree: sip  Solid: cookies            Positioning: upright in a chair  Oral Peripheral Exam: See physical exam section.  Anatomic Notes: See Videostroboscopy report for assessment of anatomy and laryngeal functioning  Pharyngeal secretions prior to administration of liquid or food: No   Oral Phase Abnormal Findings: No abnormal behavior observed   Pharyngeal Phase Abnormal Findings: safe swallow, no penetration, no aspiration    Recommended Diet:  regular                                        thin liquids             Review of Relevant Clinical Data   I personally reviewed:  Notes:   Dr. Brown 2/19/24  Vocal cord problems since surgery for lung cancer in 2016   Referral - ENT Outpatient Evaluation and treatment    Dr. Encarnacion, ENT 4/22/19  ASSESSMENT AND PLAN: A 59-year-old with left vocal fold paralysis but currently with a decent voice still. We discussed risks and benefits of thyroplasty. She currently is satisfied with how things are going and therefore, we will follow-up with her should her voice get worse again in the future.     Radiology:     CT Chest W Contrast 6/17/24  IMPRESSION: Stable postsurgical changes of left lower lobectomy.  Stable pulmonary nodules as above. No radiographic evidence of new or  worsening disease.    Pathology:    Procedures:    Labs:  No results found for: \"TSH\"  Lab Results   Component Value Date     01/27/2016    CO2 29 01/27/2016    BUN 9 01/27/2016    PHOS 2.8 01/27/2016     Lab Results   Component Value Date    WBC  01/25/2016     Unsatisfactory specimen - clotted  NOTIFIED DEANGELO HER RN AT 0630AM ON [id number] [facility].  CORRECTED ON 01/25 AT 0801: PREVIOUSLY REPORTED AS Unsatisfactory specimen   clotted AGUSTINA MADDEN,0630 01/25/16 BY       HGB 11.3 (L) 01/26/2016    HCT  01/25/2016     Unsatisfactory specimen - clotted  NOTIFIED DEANGELO HER RN AT 0630AM ON " "[id number] [facility].  CORRECTED ON 01/25 AT 0801: PREVIOUSLY REPORTED AS Unsatisfactory specimen   clotted AGUSTINA MADDEN,0630 01/25/16 BY SS      MCV  01/25/2016     Unsatisfactory specimen - clotted  NOTIFIED DEANGELO HER RN AT 0630AM ON [id number] BY.  CORRECTED ON 01/25 AT 0801: PREVIOUSLY REPORTED AS Unsatisfactory specimen   clotted AGUSTINA MADDEN,0630 01/25/16 BY SS       01/28/2016     Lab Results   Component Value Date    PTT 31 01/25/2016    INR 1.05 01/25/2016     No results found for: \"MICHELA\"  No components found for: \"RHEUMATOIDFACTOR\", \"RF\"  No results found for: \"CRP\"  No components found for: \"CKTOT\", \"URICACID\"  No components found for: \"C3\", \"C4\", \"DSDNAAB\", \"NDNAABIFA\"  No results found for: \"MPOAB\"    Patient reported Quality of Life (QOL) Measures   Patient Supplied Answers To VHI Questionnaire       No data to display                  Patient Supplied Answers To EAT Questionnaire       No data to display                  Patient Supplied Answers To CSI Questionnaire       No data to display                     Thank you for the kind referral and for allowing me to share in the care of Ms. Manning. If you have any questions, please do not hesitate to contact me.    Scribe Disclosure:   I, JORDAN SPENCE, am serving as a scribe; to document services personally performed by Jazmin Sherwood MD -based on data collection and the provider's statements to me.     Provider Disclosure:  I agree with above History, Review of Systems, Physical exam and Plan.  I have reviewed the content of the documentation and have edited it as needed. I have personally performed the services documented here and the documentation accurately represents those services and the decisions I have made.      Electronically signed by:  Jazmin Sherwood MD    Laryngology    [facility]  Department of  Otolaryngology - Head and Neck Surgery  Clinics & Surgery Center  [address] [place] [address], " [place] [address]  Appointment line: [phone]  Fax: [phone]  [email or web address]

## 2024-10-31 ENCOUNTER — OFFICE VISIT (OUTPATIENT)
Dept: OTOLARYNGOLOGY | Facility: CLINIC | Age: 64
End: 2024-10-31
Attending: FAMILY MEDICINE
Payer: COMMERCIAL

## 2024-10-31 ENCOUNTER — PATIENT OUTREACH (OUTPATIENT)
Dept: OTOLARYNGOLOGY | Facility: CLINIC | Age: 64
End: 2024-10-31

## 2024-10-31 ENCOUNTER — TELEPHONE (OUTPATIENT)
Dept: OTOLARYNGOLOGY | Facility: CLINIC | Age: 64
End: 2024-10-31

## 2024-10-31 ENCOUNTER — PRE VISIT (OUTPATIENT)
Dept: OTOLARYNGOLOGY | Facility: CLINIC | Age: 64
End: 2024-10-31

## 2024-10-31 ENCOUNTER — THERAPY VISIT (OUTPATIENT)
Dept: SPEECH THERAPY | Facility: CLINIC | Age: 64
End: 2024-10-31
Payer: COMMERCIAL

## 2024-10-31 VITALS
SYSTOLIC BLOOD PRESSURE: 130 MMHG | HEART RATE: 79 BPM | HEIGHT: 64 IN | OXYGEN SATURATION: 97 % | BODY MASS INDEX: 30.56 KG/M2 | WEIGHT: 179 LBS | DIASTOLIC BLOOD PRESSURE: 89 MMHG

## 2024-10-31 DIAGNOSIS — R49.0 DYSPHONIA: ICD-10-CM

## 2024-10-31 DIAGNOSIS — J38.00 VOCAL CORD PARALYSIS: Primary | ICD-10-CM

## 2024-10-31 DIAGNOSIS — R13.12 OROPHARYNGEAL DYSPHAGIA: ICD-10-CM

## 2024-10-31 DIAGNOSIS — Q31.8 VOCAL CORD ANOMALY: ICD-10-CM

## 2024-10-31 DIAGNOSIS — J38.00 VOCAL CORD PARALYSIS: ICD-10-CM

## 2024-10-31 DIAGNOSIS — C34.32 MALIGNANT NEOPLASM OF LOWER LOBE OF LEFT LUNG (H): Primary | ICD-10-CM

## 2024-10-31 DIAGNOSIS — J38.01 VOCAL FOLD PARALYSIS, LEFT: ICD-10-CM

## 2024-10-31 PROCEDURE — 99204 OFFICE O/P NEW MOD 45 MIN: CPT | Mod: 25 | Performed by: OTOLARYNGOLOGY

## 2024-10-31 PROCEDURE — 31579 LARYNGOSCOPY TELESCOPIC: CPT | Mod: 51 | Performed by: OTOLARYNGOLOGY

## 2024-10-31 PROCEDURE — 92610 EVALUATE SWALLOWING FUNCTION: CPT | Mod: GN | Performed by: SPEECH-LANGUAGE PATHOLOGIST

## 2024-10-31 PROCEDURE — 92613 ENDOSCOPY SWALLOW (FEES) I&R: CPT | Performed by: OTOLARYNGOLOGY

## 2024-10-31 PROCEDURE — 92524 BEHAVRAL QUALIT ANALYS VOICE: CPT | Mod: GN | Performed by: SPEECH-LANGUAGE PATHOLOGIST

## 2024-10-31 PROCEDURE — 92612 ENDOSCOPY SWALLOW (FEES) VID: CPT | Mod: GN | Performed by: OTOLARYNGOLOGY

## 2024-10-31 NOTE — PROGRESS NOTES
Outpatient Speech Language Therapy Evaluation - MEDICARE CERTIFICATION    PLAN OF TREATMENT FOR OUTPATIENT REHABILITATION    Patient's Last Name, First Name, M.I.  YOB: 1960  Vianey Manning                        Provider's Name  Key Sidhu (voice), M.S., CCC-SLP Medical Record No.  [id number]                              Onset Date:  10/31/24     Start of Care Date: 10/31/24     Type: Speech Language Therapy Medical Diagnosis: Vocal Fold Paralysis - Unilateral Left (J38.01)                         Therapy Diagnosis: Dysphonia (R49.0)    Visits from SOC:  1 total/0 therapy   _________________________________________________________________________________  Plan of Treatment:   Speech therapy    A course of speech therapy is recommended to improve voice quality and promote reduced discomfort, effort and fatigue.  F/U with Dr. Sherwood pending therapy outcomes to determine whether medical intervention is warranted.   She demonstrates a Good prognosis for improvement given adherence to therapeutic recommendations.   Positive indicators: positive response to therapy probes  Negative indicators: None  Research: This patient is willing to be contacted about participation in research. May be eligible for vocal fold paralysis study.  DURATION / FREQUENCY: 4 bi-weekly therapy sessions with any ONE voice SLP    Goals:  Patient goal:    To understand the problem and fix it as much as possible     Short-term goal(s): Within the first 4 sessions, Vianey will:  -- demonstrate silent inhalation and abdominal breathing pattern in order to optimize breathing mechanics with 90% accy and min cues.  -- demonstrate russell-laryngeal release and laryngeal massage techniques with >80% accy  -- coordinate appropriate air flow levels with forward resonance during phonation in order to minimize laryngeal compensation and effort with 90%  accy.  -- demonstrate healthy amplification strategies in order to minimize laryngeal strain and risk of injury while meeting avocational and vocational voice requirements with 80% accy and min cues.     Long-term goal(s): In 3 months, Vianey will:  -- report a 90% resolution of voice symptoms during a week of performing typical personal, social, and professional activities.    This treatment plan was developed with the patient who agreed with the recommendations.    _________________________________________________________________________________    I CERTIFY THE NEED FOR THESE SERVICES FURNISHED UNDER THIS PLAN OF TREATMENT AND WHILE UNDER MY CARE       (Physician attestation of this document indicates review and certification of the therapy plan).     Certification date from: 10/31/24  Certification date to: 1/29/25    Referring Provider: Dr. Jazmin Sherwood    [facility]  CLINICAL EVALUATION REPORT    Patient: Vianey Manning  Date of Service: 10/31/2024  Clinician: Abdullahi Sidhu, MS, CCC-SLP  Seen in conjunction with: Dr. Jazmin Sherwood  Referring physician: Addison Cobos MD  Certification Dates: 10/31/2024 - 1/29/25   State of Residence: [place]  Visit Count: 1    HISTORY  PATIENT INFORMATION  Vianey Manning is a 64 year old female presenting today for evaluation of dysphonia secondary to vocal fold paralysis. Salient details of her symptom history are as follows:  Chief complaint: She had a surgery in 2016 to remove her LLL due to lung cancer. Afterward, she was referred to Dr. Encarnacion because her left vocal fold was paralyzed. She's had repeated return appointments without spontaneous vocal fold recovery. 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 It's less important in the summers, and mostly is out of the classroom doing coordination work, but sometimes she really needs her voice.    Onset: 2016   Course:  Stable  Work/Social: Teacher, needs her voice     CURRENT SYMPTOMS INCLUDE:  VOICE: She struggles to use her voice for teaching in the classroom and in social settings, particularly with being loud enough to be heard. She used to be able to produce a deeper, loud voice in the classroom, but now feels like her voice comes and goes. She does use a microphone in the classroom. She denies changes in voice quality with use, but does notice a lot of fatigue and weakness.    COUGH/THROAT CLEAR: Denies  SWALLOWING: Denies  BREATHING: Denies, other than involuntary gasps at random times, about 3x/week. She doesn't feel SOB at all and doesn't know why it happens. She's able to walk and do stairs without issues breathing.     OTHER PERTINENT HISTORY  Medications: none related to larynx  Reflux: Denies  Post-Nasal Drip/Congestion: Denies  Please also refer to Dr. Jazmin Sherwood's dictation.     Past Medical History:   Diagnosis Date    Diverticulosis     Hoarseness 1/25/2016    Left vocal cord paralysis    Hx of colonic polyps     Hypercholesterolemia     Lung cancer (H)     Lung nodule     PONV (postoperative nausea and vomiting)      Past Surgical History:   Procedure Laterality Date    BIOPSY      breast    COLONOSCOPY      HC ABLATION, ENDOMETRIAL, THERMAL, W/O HYSTEROSCOPIC GUIDANCE      INJECT STEROID (LOCATION) Left 5/9/2016    Procedure: INJECT STEROID (LOCATION);  Surgeon: Mikey Encarnacion MD;  Location: [facility]    THORACOSCOPIC WEDGE RESECTION LUNG Left 1/25/2016    Procedure: THORACOSCOPIC WEDGE RESECTION LUNG;  Surgeon: Gabriel Herrera MD;  Location: [facility] OR    THORACOSCOPY Left 12/3/2015    Procedure: THORACOSCOPY;  Surgeon: Gabriel Herrera MD;  Location: [facility]    TRANSCERVICAL EXTENDED MEDIASTINAL LYMPHADENECTOMY Left 12/3/2015    Procedure: TRANSCERVICAL EXTENDED MEDIASTINAL LYMPHADENECTOMY;  Surgeon: Gabriel Herrera MD;  Location: [facility] OR       OBJECTIVE FINDINGS  Patient Supplied Answers To  "VHI Questionnaire      10/31/2024     2:11 PM   Voice Handicap Index (VHI-10)   My voice makes it difficult for people to hear me 2    People have difficulty understanding me in a noisy room 4    My voice difficulties restrict my personal and social life.  3    I feel left out of conversations because of my voice 3    My voice problem causes me to lose income 0    I feel as though I have to strain to produce voice 3    The clarity of my voice is unpredictable 3    My voice problem upsets me 3    My voice makes me feel handicapped 2    People ask, \"What's wrong with your voice?\" 2    VHI-10 25        Patient-reported        Patient Supplied Answers To CSI Questionnaire       No data to display                 Patient Supplied Answers To EAT Questionnaire       No data to display                 Patient Supplied Answers to Dyspnea Index Questionnaire:       No data to display             Speech follow up as discussed with patient:       No data to display                PERCEPTUAL EVALUATION (27754)  VOICE/ SPEECH/ NON-COMMUNICATIVE LARYNGEAL BEHAVIORS EVALUATION  Palpation of the laryngeal area shows:  reduced thyrohoid space  no significant tenderness  palpation did not induce cough  Breathing pattern:  clavicular elevation during inspiration, shoulder and neck involvement, and incoordination with phonation  Tension:  is not overtly evident  Cough/ Throat clear:  not observed today   Vianey states today is a typical voice day, with clinician observing voice quality characterized by:  Roughness: Mild Consistent  Breathiness: Mild Consistent  Strain: Mild  Habitual pitch is 210Hz and is WNL  Pitch glide reveals range of 171Hz to 526Hz  Loudness is mildly reduced for the setting  Maximum Phonation Time: 13 seconds  GLOBAL ASSESSMENT OF DYSPHONIA: 30/100  The GRBAS is a perceptual rating of voice change. 0 indicated no impairment, 3 indicates a severe impairment. \"C\" and \"I\" may be used to signify if these feature was " observed consistently or intermittently respectively. This is a rating based on clinical judgement of disordered voice quality.  G ( 1 ) General Dysphonia     R ( 1 ) Roughness     B ( 1 ) Breathiness     A ( 0 ) Asthenia     S ( 1 ) Strain    LARYNGEAL EXAMINATION  Procedure: Flexible endoscopy with chip-tip technology with stroboscopy, right nostril; topical anesthesia with 3% Lidocaine and 0.25% phenylephrine was applied.   Performed by: Dr. Jazmin Sherwood  Verbal consent was obtained and witnessed prior to this procedure.   A time-out was performed, verifying patient, procedure, and site.     This exam shows:  Velar Function: WNL  Secretions:  slight collection of secretions on the laryngopharyngeal structures  Laryngeal Mucosa: Essentially healthy laryngeal and pharyngeal mucosa  Vocal fold mucosa:    RTVF - smooth and straight vibratory margins, white color, slightly thinned  LTVF - smooth vibratory margins, white color and small varixes, slight bowing  Vocal fold function:   Right vocal fold appears to have normal neurological movement with full ROM, but crosses midline to provide touch  glottic closure and Left vocal fold is immobile at the paramedian position  Narrow Band Imaging (NBI) demonstrated: Findings consistent with halogen light  Airway is patent as visualized below the glottis  Elongation of the vocal folds for pitch increase is normal  mild-moderate medial ventricular constrictive supraglottic hyperfunction during connected speech    The addition of stroboscopy provided the following information:  Vibratory Behavior: Present bilaterally  Periodicity: Consistently periodic  Symmetry:  good symmetry  Amplitude Right: WNL  Amplitude Left: WNL  Mucosal Wave Right: WNL  Mucosal Wave Left: WNL  Glottic closure:  on phonation glottic closure is complete   Closure Pattern: posterior gap  Closure Plane: at glottic level  Phase Distribution: normal     STIMULABILITY: results of therapy probes during  perceptual and laryngeal evaluation demonstrate improvement with Reduced hyperfunction with use of flow/easy onset phonation and yawn-sigh and Improved glottic approximation with use of glottic coup tasks.    ASSESSMENT / PLAN  IMPRESSIONS: Vianey Manning is presenting today with Dysphonia (R49.0) in the context of Laryngeal Hyperfunction (J38.7) and Vocal Fold Paralysis - Unilateral Left (J38.01). Laryngoscopy revealed absent left vocal fold movement with compensatory mild-mod medial supraglottic hyperfunction. Slight thinning of the right vocal fold and slight bowing of the left vocal fold, with small varices of the left vocal fold. Overall good vibratory patterns, wave, and amplitude. She perceptually demonstrated mildly rough, breathy, and strained voice quality, non optimal respiratory mechanics, and narrowing of the thyrohyoid space. She would benefit from a course of speech therapy targeting improved respiratory mechanics, reduced perilaryngeal hyperfunction, and phonatory patterns targeting reduced laryngeal hyperfunction and optimized glottic efficiency, and information regarding personal amplification devices and strategies.  A course of speech therapy is recommended to improve voice quality and promote reduced discomfort, effort and fatigue.  F/U with Dr. Sherwood pending therapy outcomes to determine whether medical intervention is warranted.   She demonstrates a Good prognosis for improvement given adherence to therapeutic recommendations.   Positive indicators: positive response to therapy probes  Negative indicators: None  Research: This patient is willing to be contacted about participation in research. May be eligible for vocal fold paralysis study.  DURATION / FREQUENCY: 4 bi-weekly therapy sessions with any ONE voice SLP    Goals:  Patient goal:    To understand the problem and fix it as much as possible     Short-term goal(s): Within the first 4 sessions, Vianey will:  -- demonstrate silent  inhalation and abdominal breathing pattern in order to optimize breathing mechanics with 90% accy and min cues.  -- demonstrate russell-laryngeal release and laryngeal massage techniques with >80% accy  -- coordinate appropriate air flow levels with forward resonance during phonation in order to minimize laryngeal compensation and effort with 90% accy.  -- demonstrate healthy amplification strategies in order to minimize laryngeal strain and risk of injury while meeting avocational and vocational voice requirements with 80% accy and min cues.     Long-term goal(s): In 3 months, Vianey will:  -- report a 90% resolution of voice symptoms during a week of performing typical personal, social, and professional activities.    This treatment plan was developed with the patient who agreed with the recommendations.    TOTAL SERVICE TIME: 45 minutes  EVALUATION OF VOICE AND RESONANCE (46621)    Key Sidhu (voice) M.S., CCC-SLP  Speech-Language Pathologist  [facility]  [phone]  [email or web address]  Pronouns: she/her/hers      *this report was created in part through the use of computerized dictation software, and though reviewed following completion, some typographic errors may persist.  If there is confusion regarding any of this notes contents, please contact me for clarification

## 2024-10-31 NOTE — PROGRESS NOTES
Called patient to offer earlier appointment. LVM and provided direct number for call back. Rita Covarrubias RN on 10/31/2024 at 10:05 AM

## 2024-10-31 NOTE — PROGRESS NOTES
SPEECH LANGUAGE PATHOLOGY EVALUATION              Subjective        Presenting condition or subjective complaint: Pt seen today in conjunction with ENT clinic visit per MD request.   Date of onset:      Relevant medical history:     Past Medical History:   Diagnosis Date    Diverticulosis     Hoarseness 1/25/2016    Left vocal cord paralysis    Hx of colonic polyps     Hypercholesterolemia     Lung cancer (H)     Lung nodule     PONV (postoperative nausea and vomiting)      Dates & types of surgery:    Past Surgical History:   Procedure Laterality Date    BIOPSY      breast    COLONOSCOPY      HC ABLATION, ENDOMETRIAL, THERMAL, W/O HYSTEROSCOPIC GUIDANCE      INJECT STEROID (LOCATION) Left 5/9/2016    Procedure: INJECT STEROID (LOCATION);  Surgeon: Mikey Encarnacion MD;  Location: [facility]    THORACOSCOPIC WEDGE RESECTION LUNG Left 1/25/2016    Procedure: THORACOSCOPIC WEDGE RESECTION LUNG;  Surgeon: Gabriel Herrera MD;  Location: [facility] OR    THORACOSCOPY Left 12/3/2015    Procedure: THORACOSCOPY;  Surgeon: Gabriel Herrera MD;  Location: [facility] OR    TRANSCERVICAL EXTENDED MEDIASTINAL LYMPHADENECTOMY Left 12/3/2015    Procedure: TRANSCERVICAL EXTENDED MEDIASTINAL LYMPHADENECTOMY;  Surgeon: Gabriel Herrera MD;  Location: [facility] OR       Prior diagnostic imaging/testing results:   no    Prior therapy history for the same diagnosis, illness or injury:   no     Living Environment  Employment:    teacher    Patient goals for therapy:  see what options are available for her voice    Pain assessment: Pain denied     Objective     SWALLOW EVALUTION  Dysphagia history: Pt reports occasional issues with swallowing. Denies consistent coughing, throat clearing or feeling of pharyngeal stasis with PO intake.     Current Diet/Method of Nutritional Intake: thin liquids (level 0), regular diet      Oral Motor Function: Dentition: adequate dentition  Secretion management: WFL  Mucosal quality: adequate  Mandibular  function: intact  Oral labial function: WFL  Lingual function: WFL  Velar function: intact   Buccal function: intact  Laryngeal function: cough, throat clear, volitional swallow, voicing WFL     Level of assist required for feeding: no assistance needed   Textures Trialed:   Clinical Swallow Eval: Thin Liquids  Mode of presentation: straw   Volume presented: 4oz  Preparatory Phase: WFL  Oral Phase: WFL  Pharyngeal phase of swallow: intact   Diagnostic statement: PO trials evaluated under endoscopy completed by MD. No penetration/aspiration or significant residuals.     Clinical Swallow Eval: Purees  Mode of presentation: spoon   Volume presented: 3 tablespoons  Preparatory Phase: WFL  Oral Phase: WFL  Pharyngeal phase of swallow: intact   Diagnostic statement: PO trials evaluated under endoscopy completed by MD. No penetration/aspiration or significant residuals.     Clinical Swallow Eval: Solids  Mode of presentation: self-fed   Volume presented: 1 Haley doone  Preparatory Phase: WFL  Oral Phase: WFL  Pharyngeal phase of swallow: intact   Diagnostic statement: PO trials evaluated under endoscopy completed by MD. No penetration/aspiration or significant residuals.       ESOPHAGEAL PHASE OF SWALLOW  no observed or reported concerns related to esophageal function     SWALLOW ASSESSMENT CLINICAL IMPRESSIONS AND RATIONALE  Diet Consistency Recommendations: thin liquids (level 0), regular diet    Recommended Feeding/Eating Techniques:  general safe swallow strategies: small sips, small bites, slow pace    Medication Administration Recommendations: whole with thin liquid  Instrumental Assessment Recommendations: instrumental evaluation not recommended at this time     Assessment & Plan   CLINICAL IMPRESSIONS   Medical Diagnosis: Malignant neoplasm of lower lobe of left lung (H)  Vocal fold paralysis, left    Treatment Diagnosis: Safe, functional oropharyngeal swallow   Impression/Assessment: Pt is a 64 year old female  with left vocal fold paralysis. She presents today with a safe, functional oropharyngeal swallow. No penetration/aspiration or significant residuals with any PO trial texture trialed today under MD laryngoscopy.    PLAN OF CARE  Evaluation only    Recommended Referrals to Other Professionals:  voice SLP    Education Assessment:   Learner/Method: Patient;Listening;No Barriers to Learning;Pictures/Video    Risks and benefits of evaluation/treatment have been explained.   Patient/Family/caregiver agrees with Plan of Care.     Evaluation Time:    SLP Eval: oral/pharyngeal swallow function, clinical minutes (25819): 16      Signing Clinician: KRISSY Lew

## 2024-10-31 NOTE — PATIENT INSTRUCTIONS
1.  You were seen in the ENT Clinic today by Dr. Sherwood. If you have any questions or concerns after your appointment, please call [phone]. Press option #1 for scheduling related needs. Press option #3 for Nurse advice.    2.  Dr. Sherwood has recommended the following:   - Voice therapy   - Options for vocal fold paralysis    Temporary  Temporary material injected into the vocal fold that resorbs within 1-3months  Can be done either with local anesthesia or with general anesthesia  Local anesthesia means a camera through the nose for visualization and a needle either through the neck and the mouth. It can help titrate the amount of material for your voice. It sometimes need to be done again given technical difficulty  General anesthesia - you are asleep with an endotracheal tube to help you breathe and a jigar is used that rests on your teeth and tongue to see the vocal folds and the material is injected. It cannot be titrated since you are asleep but it is easier to get all the material in since you do not have to sit still like in the awake option    Intermediate  Injection into the vocal fold with a material that resorbs within 1 year  Usually done with general anesthesia, rarely with local anesthesia     Permanent   Implant - a silicone implant is placed through your neck to the side of your vocal fold with twilight anesthesia so that both visual and audio feedback can be used for titration of the size of the implant. There is a 25% revision rate over your lifetime (changing the size) and there is a small risk of extrusion of the implant over your lifetime. The voice improvement is immediate but it takes up to 1 month to fully settle.    Fat - use fat from your abdomen to place into your vocal fold. This is done with general anesthesia with an endotracheal tube to help you breathe and a jigar in your mouth that rests on your teeth and tongue. The voice improvement is immediate however up to 50% of the fat injected  does not take so your final voice result will happen 3-6 months after surgery. Sometimes a repeat injection is necessary.   Reinnervation - using another nerve in your neck to connect to your vocal fold nerve. This is an open neck procedure with general anesthesia. The electrical connection can take 6months to 1 year to form before voice benefit can be seen     3.  Plan is to return to clinic after voice therapy   How to Contact Us:  Send a [employer] message to your provider. Our team will respond to you via [employer]. Occasionally, we will need to call you to get further information.  For urgent matters (Monday-Friday, 8:00 AM-3:30 PM), call the ENT Clinic: [phone] and speak with a call center team member - they will route your call appropriately.   If you'd like to speak directly with a nurse, please call [phone]. We do our best to check voicemail frequently throughout the day, and will work to call you back within 1-2 days. For urgent matters, please use the general clinic phone numbers listed above.      Rita Gamino  [phone]  [facility] - Otolaryngology

## 2025-04-26 ENCOUNTER — TELEPHONE (OUTPATIENT)
Dept: SURGERY | Facility: CLINIC | Age: 65
End: 2025-04-26
Payer: COMMERCIAL

## 2025-04-26 NOTE — TELEPHONE ENCOUNTER
4/26 x3 attempts to reach pt to schedule HL/CT. LVM with scheduling info. Max attempts letter to be sent.-MS

## 2025-06-16 DIAGNOSIS — C34.32 MALIGNANT NEOPLASM OF LOWER LOBE OF LEFT LUNG (H): Primary | ICD-10-CM

## 2025-06-25 NOTE — PROGRESS NOTES
THORACIC SURGERY FOLLOW UP VISIT      I saw Mrs. Manning in follow-up today. The clinical summary follows:     PREOP DIAGNOSIS   Left lower lobe adenocarcinoma  PROCEDURE   Left transcervical thorascopic wedge biopsy 12/14/2015  Thoracoscopic LEFT lower lobe completion lobectomy, mediastinal lymphadenectomy 01/25/2016     HISTOPATHOLOGY   Invasive well differentiated 1.4cm adenocarcinoma; negative margins (12/2015)  No residual adenocarcinoma with no evidence of lymph node metastasis, pT1aN0 (01/2016)    COMPLICATIONS  Left vocal cord palsy    INTERVAL STUDIES  CT chest 06/26/2025: final report pending at time of clinic visit. To my review it appears stable compared to the June 2024 chest CT.    Past Medical History:   Diagnosis Date    Diverticulosis     Hoarseness 1/25/2016    Left vocal cord paralysis    Hx of colonic polyps     Hypercholesterolemia     Lung cancer (H)     Lung nodule     PONV (postoperative nausea and vomiting)       Past Surgical History:   Procedure Laterality Date    BIOPSY      breast    COLONOSCOPY      HC ABLATION, ENDOMETRIAL, THERMAL, W/O HYSTEROSCOPIC GUIDANCE      INJECT STEROID (LOCATION) Left 5/9/2016    Procedure: INJECT STEROID (LOCATION);  Surgeon: Mikey Encarnacion MD;  Location: [facility]    THORACOSCOPIC WEDGE RESECTION LUNG Left 1/25/2016    Procedure: THORACOSCOPIC WEDGE RESECTION LUNG;  Surgeon: Gabriel Herrera MD;  Location: [facility] OR    THORACOSCOPY Left 12/3/2015    Procedure: THORACOSCOPY;  Surgeon: Gabriel Herrera MD;  Location: [facility]    TRANSCERVICAL EXTENDED MEDIASTINAL LYMPHADENECTOMY Left 12/3/2015    Procedure: TRANSCERVICAL EXTENDED MEDIASTINAL LYMPHADENECTOMY;  Surgeon: Gabriel Herrera MD;  Location: [facility] OR      Social History     Socioeconomic History    Marital status:      Spouse name: Not on file    Number of children: Not on file    Years of education: Not on file    Highest education level: Not on file   Occupational History     "Not on file   Tobacco Use    Smoking status: Former     Current packs/day: 0.50     Average packs/day: 0.5 packs/day for 27.5 years (13.7 ttl pk-yrs)     Types: Cigarettes     Start date: 1/1/1998    Smokeless tobacco: Never    Tobacco comments:     since offically quitting 18 years ago, she does still have cigarette now and again, weekly it seems   Substance and Sexual Activity    Alcohol use: Yes     Alcohol/week: 6.0 - 8.0 standard drinks of alcohol     Comment: weekends  4 - 6 drinks., Not as much since lung surgery. 2016    Drug use: No    Sexual activity: Yes     Partners: Male     Birth control/protection: None   Other Topics Concern    Parent/sibling w/ CABG, MI or angioplasty before 65F 55M? Not Asked   Social History Narrative    The patient has a 20+ pk yr tobacco hx.  She has occassional tobacco use.  Alcohol use is 3-8 alcoholic drinks per week.  She denies use of recreational drugs.          She works as a . .          The patient is .  Has 3 children.        Hot Tub Exposure: NO    Recent Travel: NO     Hx of incarceration:  NO    Bird Exposure:   NO    Animal Exposure:  NO    Inhalation Exposure:  NO     Social Drivers of Health     Financial Resource Strain: Not on file   Food Insecurity: Not on file   Transportation Needs: Not on file   Physical Activity: Not on file   Stress: Not on file   Social Connections: Not on file   Interpersonal Safety: Not on file   Housing Stability: Not on file        SUBJECTIVE   Vianey is doing great. She denies cough or shortness of breath.    OBJECTIVE  /87 (BP Location: Right arm, Patient Position: Sitting, Cuff Size: Adult Regular)   Pulse 75   Temp 97.5  F (36.4  C) (Oral)   Resp 12   Ht 1.635 m (5' 4.37\")   Wt 77.9 kg (171 lb 12.8 oz)   SpO2 97%   BMI 29.15 kg/m       From a personal perspective, she is here alone today.    IMPRESSION  Vianey is a 65 year-old female status post left transcervical thorascopic wedge biopsy, " thoracoscopic LEFT lower lobe completion lobectomy and mediastinal lymphadenectomy for a pT1aN0 (stage IA1) non small cell lung cancer. She is here today for lung cancer surveillance.    I will await the final report for today's chest CT and will call Vianey if there is any change to the follow up plan.    PLAN  I spent 15 min on the date of the encounter in chart review, patient visit, review of tests, documentation and/or discussion with other providers about the issues documented above. I reviewed the plan as follows:  Follow up with me in 1 year with chest CT prior  1. Necessary Tests & Appointments: chest CT  All questions were answered and the patient and present family were in agreement with the plan.  I appreciate the opportunity to participate in the care of your patient and will keep you updated.  Sincerely,

## 2025-06-26 ENCOUNTER — ONCOLOGY VISIT (OUTPATIENT)
Dept: SURGERY | Facility: CLINIC | Age: 65
End: 2025-06-26
Attending: CLINICAL NURSE SPECIALIST
Payer: COMMERCIAL

## 2025-06-26 VITALS
WEIGHT: 171.8 LBS | HEART RATE: 75 BPM | SYSTOLIC BLOOD PRESSURE: 135 MMHG | RESPIRATION RATE: 12 BRPM | HEIGHT: 64 IN | DIASTOLIC BLOOD PRESSURE: 87 MMHG | OXYGEN SATURATION: 97 % | BODY MASS INDEX: 29.33 KG/M2 | TEMPERATURE: 97.5 F

## 2025-06-26 DIAGNOSIS — C34.32 MALIGNANT NEOPLASM OF LOWER LOBE OF LEFT LUNG (H): Primary | ICD-10-CM

## 2025-06-26 PROCEDURE — 99213 OFFICE O/P EST LOW 20 MIN: CPT | Performed by: CLINICAL NURSE SPECIALIST

## 2025-06-26 RX ORDER — NEEDLES, SAFETY 22GX1 1/2"
NEEDLE, DISPOSABLE MISCELLANEOUS
COMMUNITY
Start: 2025-04-28

## 2025-06-26 RX ORDER — NEEDLES, DISPOSABLE 25GX5/8"
NEEDLE, DISPOSABLE MISCELLANEOUS
COMMUNITY
Start: 2025-04-28

## 2025-06-26 ASSESSMENT — PAIN SCALES - GENERAL: PAINLEVEL_OUTOF10: NO PAIN (0)

## 2025-06-26 NOTE — LETTER
6/26/2025      Vianey Manning  [address] [address]      Dear Colleague,    Thank you for referring your patient, Vianey Manning, to the [facility]. Please see a copy of my visit note below.    THORACIC SURGERY FOLLOW UP VISIT      I saw Mrs. Manning in follow-up today. The clinical summary follows:     PREOP DIAGNOSIS   Left lower lobe adenocarcinoma  PROCEDURE   Left transcervical thorascopic wedge biopsy 12/14/2015  Thoracoscopic LEFT lower lobe completion lobectomy, mediastinal lymphadenectomy 01/25/2016     HISTOPATHOLOGY   Invasive well differentiated 1.4cm adenocarcinoma; negative margins (12/2015)  No residual adenocarcinoma with no evidence of lymph node metastasis, pT1aN0 (01/2016)    COMPLICATIONS  Left vocal cord palsy    INTERVAL STUDIES  CT chest 06/26/2025: final report pending at time of clinic visit. To my review it appears stable compared to the June 2024 chest CT.    Past Medical History:   Diagnosis Date     Diverticulosis      Hoarseness 1/25/2016    Left vocal cord paralysis     Hx of colonic polyps      Hypercholesterolemia      Lung cancer (H)      Lung nodule      PONV (postoperative nausea and vomiting)       Past Surgical History:   Procedure Laterality Date     BIOPSY      breast     COLONOSCOPY       HC ABLATION, ENDOMETRIAL, THERMAL, W/O HYSTEROSCOPIC GUIDANCE       INJECT STEROID (LOCATION) Left 5/9/2016    Procedure: INJECT STEROID (LOCATION);  Surgeon: Mikey Encarnacion MD;  [facility]     THORACOSCOPIC WEDGE RESECTION LUNG Left 1/25/2016    Procedure: THORACOSCOPIC WEDGE RESECTION LUNG;  Surgeon: Gabriel Herrera MD;  Location: [facility]     THORACOSCOPY Left 12/3/2015    Procedure: THORACOSCOPY;  Surgeon: Gabriel Herrera MD;  Location: [facility]     TRANSCERVICAL EXTENDED MEDIASTINAL LYMPHADENECTOMY Left 12/3/2015    Procedure: TRANSCERVICAL EXTENDED MEDIASTINAL LYMPHADENECTOMY;  Surgeon: Gabriel Herrera  MD Mike;  Location:  OR      Social History     Socioeconomic History     Marital status:      Spouse name: Not on file     Number of children: Not on file     Years of education: Not on file     Highest education level: Not on file   Occupational History     Not on file   Tobacco Use     Smoking status: Former     Current packs/day: 0.50     Average packs/day: 0.5 packs/day for 27.5 years (13.7 ttl pk-yrs)     Types: Cigarettes     Start date: 1/1/1998     Smokeless tobacco: Never     Tobacco comments:     since offically quitting 18 years ago, she does still have cigarette now and again, weekly it seems   Substance and Sexual Activity     Alcohol use: Yes     Alcohol/week: 6.0 - 8.0 standard drinks of alcohol     Comment: weekends  4 - 6 drinks., Not as much since lung surgery. 2016     Drug use: No     Sexual activity: Yes     Partners: Male     Birth control/protection: None   Other Topics Concern     Parent/sibling w/ CABG, MI or angioplasty before 65F 55M? Not Asked   Social History Narrative    The patient has a 20+ pk yr tobacco hx.  She has occassional tobacco use.  Alcohol use is 3-8 alcoholic drinks per week.  She denies use of recreational drugs.          She works as a . .          The patient is .  Has 3 children.        Hot Tub Exposure: NO    Recent Travel: NO     Hx of incarceration:  NO    Bird Exposure:   NO    Animal Exposure:  NO    Inhalation Exposure:  NO     Social Drivers of Health     Financial Resource Strain: Not on file   Food Insecurity: Not on file   Transportation Needs: Not on file   Physical Activity: Not on file   Stress: Not on file   Social Connections: Not on file   Interpersonal Safety: Not on file   Housing Stability: Not on file        SUBJECTIVE   Vianey is doing great. She denies cough or shortness of breath.    OBJECTIVE  /87 (BP Location: Right arm, Patient Position: Sitting, Cuff Size: Adult Regular)   Pulse 75   Temp 97.5  F  "(36.4  C) (Oral)   Resp 12   Ht 1.635 m (5' 4.37\")   Wt 77.9 kg (171 lb 12.8 oz)   SpO2 97%   BMI 29.15 kg/m       From a personal perspective, she is here alone today.    LUIS Winters is a 65 year-old female status post left transcervical thorascopic wedge biopsy, thoracoscopic LEFT lower lobe completion lobectomy and mediastinal lymphadenectomy for a pT1aN0 (stage IA1) non small cell lung cancer. She is here today for lung cancer surveillance.    I will await the final report for today's chest CT and will call Vianey if there is any change to the follow up plan.    PLAN  I spent 15 min on the date of the encounter in chart review, patient visit, review of tests, documentation and/or discussion with other providers about the issues documented above. I reviewed the plan as follows:  Follow up with me in 1 year with chest CT prior  1. Necessary Tests & Appointments: chest CT  All questions were answered and the patient and present family were in agreement with the plan.  I appreciate the opportunity to participate in the care of your patient and will keep you updated.  Sincerely,      Again, thank you for allowing me to participate in the care of your patient.        Sincerely,        SCOT Martinez    Electronically signed"

## 2025-06-26 NOTE — NURSING NOTE
"Oncology Rooming Note    June 26, 2025 8:03 AM   Vianey Manning is a 65 year old female who presents for:    Chief Complaint   Patient presents with    Oncology Clinic Visit     RTN malignant neoplasm of lower left lung      Initial Vitals: /87 (BP Location: Right arm, Patient Position: Sitting, Cuff Size: Adult Regular)   Pulse 75   Temp 97.5  F (36.4  C) (Oral)   Resp 12   Ht 1.635 m (5' 4.37\")   Wt 77.9 kg (171 lb 12.8 oz)   SpO2 97%   BMI 29.15 kg/m   Estimated body mass index is 29.15 kg/m  as calculated from the following:    Height as of this encounter: 1.635 m (5' 4.37\").    Weight as of this encounter: 77.9 kg (171 lb 12.8 oz). Body surface area is 1.88 meters squared.  No Pain (0) Comment: Data Unavailable   No LMP recorded. Patient has had an ablation.  Allergies reviewed: Yes  Medications reviewed: Yes    Medications: Medication refills not needed today.  Pharmacy name entered into [email or web address].: [id number] - [place] [id number] [facility] [place] [facility] [address]    Frailty Screening:   Is the patient here for a new oncology consult visit in cancer care? 2. No    PHQ9:  Did this patient require a PHQ9?: No      Clinical concerns: None       Bob Duvall             "

## 2025-06-26 NOTE — NURSING NOTE
"Oncology Rooming Note    June 26, 2025 7:59 AM   Vianey Manning is a 65 year old female who presents for:    Chief Complaint   Patient presents with    Oncology Clinic Visit     RTN malignant neoplasm of lower left lung      Initial Vitals: /87 (BP Location: Right arm, Patient Position: Sitting, Cuff Size: Adult Regular)   Pulse 75   Temp 97.5  F (36.4  C) (Oral)   Resp 12   Wt 77.9 kg (171 lb 12.8 oz)   SpO2 97%   BMI 29.96 kg/m   Estimated body mass index is 29.96 kg/m  as calculated from the following:    Height as of 10/31/24: 1.613 m (5' 3.5\").    Weight as of this encounter: 77.9 kg (171 lb 12.8 oz). Body surface area is 1.87 meters squared.  No Pain (0) Comment: Data Unavailable   No LMP recorded. Patient has had an ablation.  Allergies reviewed: Yes  Medications reviewed: Yes    Medications: Medication refills not needed today.  Pharmacy name entered into [employer]Ã¢â€žÂ¢: [facility] #[id number] [place] [address] [place] [facility] [place] [address]    Frailty Screening:   Is the patient here for a new oncology consult visit in cancer care? 2. No    PHQ9:  Did this patient require a PHQ9?: No      Clinical concerns: None      Bob Duvall             "

## 2025-07-13 ENCOUNTER — HEALTH MAINTENANCE LETTER (OUTPATIENT)
Age: 65
End: 2025-07-13